# Patient Record
Sex: MALE | Race: WHITE | URBAN - METROPOLITAN AREA
[De-identification: names, ages, dates, MRNs, and addresses within clinical notes are randomized per-mention and may not be internally consistent; named-entity substitution may affect disease eponyms.]

---

## 2018-04-13 ENCOUNTER — APPOINTMENT (RX ONLY)
Dept: URBAN - METROPOLITAN AREA CLINIC 161 | Facility: CLINIC | Age: 83
Setting detail: DERMATOLOGY
End: 2018-04-13

## 2018-04-13 DIAGNOSIS — D22 MELANOCYTIC NEVI: ICD-10-CM

## 2018-04-13 DIAGNOSIS — L57.0 ACTINIC KERATOSIS: ICD-10-CM

## 2018-04-13 DIAGNOSIS — L82.1 OTHER SEBORRHEIC KERATOSIS: ICD-10-CM

## 2018-04-13 PROBLEM — D22.5 MELANOCYTIC NEVI OF TRUNK: Status: ACTIVE | Noted: 2018-04-13

## 2018-04-13 PROCEDURE — ? COUNSELING

## 2018-04-13 PROCEDURE — 99213 OFFICE O/P EST LOW 20 MIN: CPT | Mod: 25

## 2018-04-13 PROCEDURE — 17003 DESTRUCT PREMALG LES 2-14: CPT

## 2018-04-13 PROCEDURE — 17000 DESTRUCT PREMALG LESION: CPT

## 2018-04-13 PROCEDURE — ? LIQUID NITROGEN

## 2018-04-13 ASSESSMENT — LOCATION SIMPLE DESCRIPTION DERM
LOCATION SIMPLE: LEFT ANKLE
LOCATION SIMPLE: LEFT UPPER BACK
LOCATION SIMPLE: SCALP
LOCATION SIMPLE: RIGHT UPPER ARM
LOCATION SIMPLE: CHEST
LOCATION SIMPLE: ABDOMEN
LOCATION SIMPLE: RIGHT ANKLE
LOCATION SIMPLE: LEFT PRETIBIAL REGION
LOCATION SIMPLE: RIGHT FOREHEAD
LOCATION SIMPLE: RIGHT LOWER BACK
LOCATION SIMPLE: RIGHT PRETIBIAL REGION

## 2018-04-13 ASSESSMENT — LOCATION ZONE DERM
LOCATION ZONE: FACE
LOCATION ZONE: TRUNK
LOCATION ZONE: SCALP
LOCATION ZONE: LEG
LOCATION ZONE: ARM

## 2018-04-13 ASSESSMENT — LOCATION DETAILED DESCRIPTION DERM
LOCATION DETAILED: LEFT DISTAL PRETIBIAL REGION
LOCATION DETAILED: RIGHT DISTAL LATERAL POSTERIOR UPPER ARM
LOCATION DETAILED: RIGHT DISTAL PRETIBIAL REGION
LOCATION DETAILED: RIGHT POSTERIOR ANKLE
LOCATION DETAILED: LEFT CENTRAL PARIETAL SCALP
LOCATION DETAILED: RIGHT MEDIAL FOREHEAD
LOCATION DETAILED: RIGHT INFERIOR MEDIAL MIDBACK
LOCATION DETAILED: EPIGASTRIC SKIN
LOCATION DETAILED: LEFT MEDIAL SUPERIOR CHEST
LOCATION DETAILED: LEFT MID-UPPER BACK
LOCATION DETAILED: LEFT POSTERIOR ANKLE
LOCATION DETAILED: RIGHT SUPERIOR FOREHEAD

## 2018-04-13 NOTE — HPI: EVALUATION OF SKIN LESION(S)
How Severe Are Your Spot(S)?: moderate
Have Your Spot(S) Been Treated In The Past?: has been treated
Hpi Title: Evaluation of Skin Lesions
Additional History: Patient states he applied Efudex to the lesion x5-6 weeks.  Patient states slight improvement, however the lesion has not resolved.

## 2019-03-04 ENCOUNTER — APPOINTMENT (RX ONLY)
Dept: URBAN - METROPOLITAN AREA CLINIC 173 | Facility: CLINIC | Age: 84
Setting detail: DERMATOLOGY
End: 2019-03-04

## 2019-03-04 DIAGNOSIS — L57.0 ACTINIC KERATOSIS: ICD-10-CM

## 2019-03-04 DIAGNOSIS — I83.9 ASYMPTOMATIC VARICOSE VEINS OF LOWER EXTREMITIES: ICD-10-CM

## 2019-03-04 DIAGNOSIS — D22 MELANOCYTIC NEVI: ICD-10-CM

## 2019-03-04 DIAGNOSIS — Z71.89 OTHER SPECIFIED COUNSELING: ICD-10-CM

## 2019-03-04 DIAGNOSIS — D18.0 HEMANGIOMA: ICD-10-CM

## 2019-03-04 DIAGNOSIS — L82.1 OTHER SEBORRHEIC KERATOSIS: ICD-10-CM

## 2019-03-04 DIAGNOSIS — L85.3 XEROSIS CUTIS: ICD-10-CM

## 2019-03-04 DIAGNOSIS — D69.2 OTHER NONTHROMBOCYTOPENIC PURPURA: ICD-10-CM

## 2019-03-04 PROBLEM — D22.5 MELANOCYTIC NEVI OF TRUNK: Status: ACTIVE | Noted: 2019-03-04

## 2019-03-04 PROBLEM — D18.01 HEMANGIOMA OF SKIN AND SUBCUTANEOUS TISSUE: Status: ACTIVE | Noted: 2019-03-04

## 2019-03-04 PROBLEM — I83.93 ASYMPTOMATIC VARICOSE VEINS OF BILATERAL LOWER EXTREMITIES: Status: ACTIVE | Noted: 2019-03-04

## 2019-03-04 PROCEDURE — ? LIQUID NITROGEN

## 2019-03-04 PROCEDURE — 17000 DESTRUCT PREMALG LESION: CPT

## 2019-03-04 PROCEDURE — 99213 OFFICE O/P EST LOW 20 MIN: CPT | Mod: 25

## 2019-03-04 PROCEDURE — ? COUNSELING

## 2019-03-04 PROCEDURE — 17003 DESTRUCT PREMALG LES 2-14: CPT

## 2019-03-04 ASSESSMENT — LOCATION SIMPLE DESCRIPTION DERM
LOCATION SIMPLE: LEFT FOREARM
LOCATION SIMPLE: RIGHT PRETIBIAL REGION
LOCATION SIMPLE: UPPER BACK
LOCATION SIMPLE: RIGHT THIGH
LOCATION SIMPLE: LEFT SCALP
LOCATION SIMPLE: RIGHT TEMPLE
LOCATION SIMPLE: LEFT PRETIBIAL REGION
LOCATION SIMPLE: LEFT CALF
LOCATION SIMPLE: RIGHT UPPER BACK
LOCATION SIMPLE: RIGHT CALF
LOCATION SIMPLE: CHEST
LOCATION SIMPLE: LEFT THIGH
LOCATION SIMPLE: ABDOMEN
LOCATION SIMPLE: RIGHT FOREARM
LOCATION SIMPLE: POSTERIOR SCALP

## 2019-03-04 ASSESSMENT — LOCATION DETAILED DESCRIPTION DERM
LOCATION DETAILED: LEFT PROXIMAL CALF
LOCATION DETAILED: LEFT MEDIAL SUPERIOR CHEST
LOCATION DETAILED: RIGHT PROXIMAL DORSAL FOREARM
LOCATION DETAILED: LEFT DISTAL DORSAL FOREARM
LOCATION DETAILED: RIGHT ANTERIOR DISTAL THIGH
LOCATION DETAILED: LEFT PROXIMAL PRETIBIAL REGION
LOCATION DETAILED: RIGHT SUPERIOR UPPER BACK
LOCATION DETAILED: INFERIOR THORACIC SPINE
LOCATION DETAILED: LEFT PROXIMAL DORSAL FOREARM
LOCATION DETAILED: RIGHT CENTRAL TEMPLE
LOCATION DETAILED: RIGHT PROXIMAL CALF
LOCATION DETAILED: LEFT CENTRAL FRONTAL SCALP
LOCATION DETAILED: RIGHT PROXIMAL PRETIBIAL REGION
LOCATION DETAILED: LEFT SUPERIOR OCCIPITAL SCALP
LOCATION DETAILED: LEFT POSTERIOR PARIETAL SCALP
LOCATION DETAILED: LEFT ANTERIOR DISTAL THIGH
LOCATION DETAILED: PERIUMBILICAL SKIN
LOCATION DETAILED: EPIGASTRIC SKIN

## 2019-03-04 ASSESSMENT — LOCATION ZONE DERM
LOCATION ZONE: SCALP
LOCATION ZONE: FACE
LOCATION ZONE: ARM
LOCATION ZONE: TRUNK
LOCATION ZONE: LEG

## 2021-03-18 ENCOUNTER — APPOINTMENT (RX ONLY)
Dept: URBAN - METROPOLITAN AREA CLINIC 158 | Facility: CLINIC | Age: 86
Setting detail: DERMATOLOGY
End: 2021-03-18

## 2021-03-18 DIAGNOSIS — L57.0 ACTINIC KERATOSIS: ICD-10-CM

## 2021-03-18 DIAGNOSIS — L98.8 OTHER SPECIFIED DISORDERS OF THE SKIN AND SUBCUTANEOUS TISSUE: ICD-10-CM

## 2021-03-18 DIAGNOSIS — L82.1 OTHER SEBORRHEIC KERATOSIS: ICD-10-CM

## 2021-03-18 DIAGNOSIS — D22 MELANOCYTIC NEVI: ICD-10-CM

## 2021-03-18 DIAGNOSIS — D69.2 OTHER NONTHROMBOCYTOPENIC PURPURA: ICD-10-CM

## 2021-03-18 DIAGNOSIS — L73.8 OTHER SPECIFIED FOLLICULAR DISORDERS: ICD-10-CM

## 2021-03-18 PROBLEM — D48.5 NEOPLASM OF UNCERTAIN BEHAVIOR OF SKIN: Status: ACTIVE | Noted: 2021-03-18

## 2021-03-18 PROBLEM — D22.5 MELANOCYTIC NEVI OF TRUNK: Status: ACTIVE | Noted: 2021-03-18

## 2021-03-18 PROCEDURE — 17000 DESTRUCT PREMALG LESION: CPT

## 2021-03-18 PROCEDURE — 69100 BIOPSY OF EXTERNAL EAR: CPT | Mod: 59

## 2021-03-18 PROCEDURE — 17003 DESTRUCT PREMALG LES 2-14: CPT

## 2021-03-18 PROCEDURE — 99213 OFFICE O/P EST LOW 20 MIN: CPT | Mod: 25

## 2021-03-18 PROCEDURE — ? COUNSELING

## 2021-03-18 PROCEDURE — ? BIOPSY BY SHAVE METHOD

## 2021-03-18 PROCEDURE — ? LIQUID NITROGEN

## 2021-03-18 ASSESSMENT — LOCATION ZONE DERM
LOCATION ZONE: EAR
LOCATION ZONE: SCALP
LOCATION ZONE: LEG
LOCATION ZONE: TRUNK
LOCATION ZONE: ARM
LOCATION ZONE: FACE

## 2021-03-18 ASSESSMENT — LOCATION SIMPLE DESCRIPTION DERM
LOCATION SIMPLE: RIGHT PRETIBIAL REGION
LOCATION SIMPLE: CHEST
LOCATION SIMPLE: RIGHT FOREHEAD
LOCATION SIMPLE: LOWER BACK
LOCATION SIMPLE: LEFT FOREARM
LOCATION SIMPLE: LEFT PRETIBIAL REGION
LOCATION SIMPLE: RIGHT EAR
LOCATION SIMPLE: RIGHT FOREARM
LOCATION SIMPLE: LEFT EAR
LOCATION SIMPLE: ABDOMEN
LOCATION SIMPLE: ANTERIOR SCALP
LOCATION SIMPLE: POSTERIOR SCALP
LOCATION SIMPLE: UPPER BACK
LOCATION SIMPLE: RIGHT SCALP

## 2021-03-18 ASSESSMENT — LOCATION DETAILED DESCRIPTION DERM
LOCATION DETAILED: LEFT DISTAL DORSAL FOREARM
LOCATION DETAILED: LEFT INFERIOR POSTERIOR HELIX
LOCATION DETAILED: EPIGASTRIC SKIN
LOCATION DETAILED: MID-FRONTAL SCALP
LOCATION DETAILED: LEFT POSTERIOR EAR
LOCATION DETAILED: STERNUM
LOCATION DETAILED: RIGHT DISTAL PRETIBIAL REGION
LOCATION DETAILED: SUPERIOR THORACIC SPINE
LOCATION DETAILED: SUPERIOR LUMBAR SPINE
LOCATION DETAILED: RIGHT SUPERIOR POSTERIOR HELIX
LOCATION DETAILED: RIGHT INFERIOR FOREHEAD
LOCATION DETAILED: LEFT DISTAL PRETIBIAL REGION
LOCATION DETAILED: RIGHT DISTAL DORSAL FOREARM
LOCATION DETAILED: POSTERIOR MID-PARIETAL SCALP
LOCATION DETAILED: RIGHT SUPERIOR POSTERIOR PARIETAL SCALP
LOCATION DETAILED: PERIUMBILICAL SKIN
LOCATION DETAILED: RIGHT SUPERIOR MEDIAL FOREHEAD
LOCATION DETAILED: RIGHT MEDIAL FRONTAL SCALP

## 2021-03-18 ASSESSMENT — BSA RASH: BSA RASH: 2

## 2021-03-18 ASSESSMENT — SEVERITY ASSESSMENT: SEVERITY: MILD

## 2021-03-30 ENCOUNTER — APPOINTMENT (RX ONLY)
Dept: URBAN - METROPOLITAN AREA CLINIC 158 | Facility: CLINIC | Age: 86
Setting detail: DERMATOLOGY
End: 2021-03-30

## 2021-03-30 DIAGNOSIS — L57.0 ACTINIC KERATOSIS: ICD-10-CM

## 2021-03-30 PROCEDURE — 17000 DESTRUCT PREMALG LESION: CPT

## 2021-03-30 PROCEDURE — ? LIQUID NITROGEN

## 2021-03-30 ASSESSMENT — LOCATION SIMPLE DESCRIPTION DERM: LOCATION SIMPLE: RIGHT EAR

## 2021-03-30 ASSESSMENT — LOCATION DETAILED DESCRIPTION DERM: LOCATION DETAILED: RIGHT INFERIOR POSTERIOR HELIX

## 2021-03-30 ASSESSMENT — LOCATION ZONE DERM: LOCATION ZONE: EAR

## 2022-03-17 ENCOUNTER — APPOINTMENT (RX ONLY)
Dept: URBAN - METROPOLITAN AREA CLINIC 158 | Facility: CLINIC | Age: 87
Setting detail: DERMATOLOGY
End: 2022-03-17

## 2022-03-17 DIAGNOSIS — L81.4 OTHER MELANIN HYPERPIGMENTATION: ICD-10-CM

## 2022-03-17 DIAGNOSIS — D18.0 HEMANGIOMA: ICD-10-CM

## 2022-03-17 DIAGNOSIS — L82.1 OTHER SEBORRHEIC KERATOSIS: ICD-10-CM

## 2022-03-17 DIAGNOSIS — D22 MELANOCYTIC NEVI: ICD-10-CM

## 2022-03-17 DIAGNOSIS — L57.0 ACTINIC KERATOSIS: ICD-10-CM

## 2022-03-17 PROBLEM — D18.01 HEMANGIOMA OF SKIN AND SUBCUTANEOUS TISSUE: Status: ACTIVE | Noted: 2022-03-17

## 2022-03-17 PROBLEM — D22.5 MELANOCYTIC NEVI OF TRUNK: Status: ACTIVE | Noted: 2022-03-17

## 2022-03-17 PROCEDURE — ? COUNSELING

## 2022-03-17 PROCEDURE — 99213 OFFICE O/P EST LOW 20 MIN: CPT

## 2022-03-17 PROCEDURE — ? TREATMENT REGIMEN

## 2022-03-17 ASSESSMENT — LOCATION DETAILED DESCRIPTION DERM
LOCATION DETAILED: RIGHT CENTRAL MALAR CHEEK
LOCATION DETAILED: LEFT POSTERIOR SHOULDER
LOCATION DETAILED: LEFT LATERAL FOREHEAD
LOCATION DETAILED: RIGHT SUPERIOR LATERAL NECK
LOCATION DETAILED: RIGHT INFERIOR LATERAL FOREHEAD
LOCATION DETAILED: RIGHT CENTRAL ZYGOMA
LOCATION DETAILED: LEFT MEDIAL TRAPEZIAL NECK
LOCATION DETAILED: EPIGASTRIC SKIN

## 2022-03-17 ASSESSMENT — LOCATION SIMPLE DESCRIPTION DERM
LOCATION SIMPLE: LEFT SHOULDER
LOCATION SIMPLE: ABDOMEN
LOCATION SIMPLE: POSTERIOR NECK
LOCATION SIMPLE: LEFT FOREHEAD
LOCATION SIMPLE: NECK
LOCATION SIMPLE: RIGHT ZYGOMA
LOCATION SIMPLE: RIGHT FOREHEAD
LOCATION SIMPLE: RIGHT CHEEK

## 2022-03-17 ASSESSMENT — LOCATION ZONE DERM
LOCATION ZONE: NECK
LOCATION ZONE: ARM
LOCATION ZONE: TRUNK
LOCATION ZONE: FACE

## 2022-05-26 NOTE — TELEPHONE ENCOUNTER
FUTURE VISIT INFORMATION      FUTURE VISIT INFORMATION:    Date: 9/12/22    Time: 9AM    [place] [facility] INFORMATION:    Referring provider:      Referring providers clinic:      Reason for visit/diagnosis  Paralyzed vocal cord, per pt, pt will have med rec [place] [facility] [place]    RECORDS REQUESTED FROM:       Clinic name Comments Records Status Imaging Status   [place] [address] Speech Language Pathology   10/24/2011 note from Bhavana Alvarado, SLP  Care everywhere     [facility] [place] [facility] Pulmonary Medicine   9/22/2010 note from Digna Franco APRN, CNP   Care everywhere    [facility] Endoscopy   5/13/2010 Bronchoscopy   Care everywhere    [facility] 380 Ear, Nose, and Throat  10/30/2008 note from Sravani Randolph MD   Care everywhere

## 2022-09-12 ENCOUNTER — VIRTUAL VISIT (OUTPATIENT)
Dept: OTOLARYNGOLOGY | Facility: CLINIC | Age: 87
End: 2022-09-12
Payer: COMMERCIAL

## 2022-09-12 ENCOUNTER — PRE VISIT (OUTPATIENT)
Dept: OTOLARYNGOLOGY | Facility: CLINIC | Age: 87
End: 2022-09-12
Payer: COMMERCIAL

## 2022-09-12 ENCOUNTER — OFFICE VISIT (OUTPATIENT)
Dept: OTOLARYNGOLOGY | Facility: CLINIC | Age: 87
End: 2022-09-12
Payer: COMMERCIAL

## 2022-09-12 VITALS
WEIGHT: 165.9 LBS | HEART RATE: 72 BPM | SYSTOLIC BLOOD PRESSURE: 150 MMHG | DIASTOLIC BLOOD PRESSURE: 75 MMHG | HEIGHT: 72 IN | BODY MASS INDEX: 22.47 KG/M2

## 2022-09-12 DIAGNOSIS — R49.0 DYSPHONIA: Primary | ICD-10-CM

## 2022-09-12 DIAGNOSIS — R49.8 VOCAL TREMOR: ICD-10-CM

## 2022-09-12 DIAGNOSIS — R13.19 OTHER DYSPHAGIA: ICD-10-CM

## 2022-09-12 DIAGNOSIS — J38.01 VOCAL FOLD PARALYSIS, RIGHT: ICD-10-CM

## 2022-09-12 DIAGNOSIS — J38.01 UNILATERAL COMPLETE PARALYSIS OF VOCAL CORD: Primary | ICD-10-CM

## 2022-09-12 DIAGNOSIS — R49.0 DYSPHONIA: ICD-10-CM

## 2022-09-12 PROCEDURE — 31579 LARYNGOSCOPY TELESCOPIC: CPT | Performed by: OTOLARYNGOLOGY

## 2022-09-12 PROCEDURE — 99204 OFFICE O/P NEW MOD 45 MIN: CPT | Mod: 25 | Performed by: OTOLARYNGOLOGY

## 2022-09-12 PROCEDURE — 92524 BEHAVRAL QUALIT ANALYS VOICE: CPT | Mod: GN | Performed by: SPEECH-LANGUAGE PATHOLOGIST

## 2022-09-12 RX ORDER — ACETAMINOPHEN 500 MG
TABLET ORAL EVERY 6 HOURS PRN
COMMUNITY

## 2022-09-12 RX ORDER — OMEGA-3/DHA/EPA/FISH OIL 300-1000MG
CAPSULE ORAL
COMMUNITY

## 2022-09-12 RX ORDER — LISINOPRIL 10 MG/1
1 TABLET ORAL DAILY
COMMUNITY
Start: 2022-08-22 | End: 2023-08-22

## 2022-09-12 RX ORDER — TAMSULOSIN HYDROCHLORIDE 0.4 MG/1
1 CAPSULE ORAL DAILY
COMMUNITY
Start: 2021-11-24 | End: 2022-11-24

## 2022-09-12 RX ORDER — ROSUVASTATIN CALCIUM 10 MG/1
10 TABLET, COATED ORAL DAILY
COMMUNITY
Start: 2022-07-02

## 2022-09-12 RX ORDER — APIXABAN 5 MG/1
TABLET, FILM COATED ORAL
COMMUNITY
Start: 2022-06-30

## 2022-09-12 ASSESSMENT — PAIN SCALES - GENERAL: PAINLEVEL: NO PAIN (0)

## 2022-09-12 NOTE — PROGRESS NOTES
"[facility]  Clinical Voice and Upper Airway Evaluation Report    Patient's Name: Kasi Dalton  Date of Evaluation: 9/12/22  Providing SLP: Cheryl Taylor MS CCC-SLP  Seen in Conjunction With: Leonor Zeng MD MPH  Referring Provider and Facility: self-referred  Chief Complaint: dysphonia  Assessment / Treatment Time: 1 hour  Evaluation Location: [facility]  Others in Attendance for the Evaluation: None      Patient History: Ramses is a 87-year-old male who presents today for evaluation of dysphonia.     Dysphonia:     Onset: Unknown. Evaluation from 10/30/2008 with Dr. Sravani Randolph notes idiopathic right vocal fold paralysis at that time. Ramses felt that the onset was due to medication interaction and his nebulizer    Progression: Stable since onset    Voice complaints: rough and gravelly voice quality that worsens with voice use, lack of power, can no longer seeing    Denies throat discomfort    Reason for seeking treatment: A friend of his had received a vocal fold injection of some sort in our clinic, and he felt that it may be helpful for him.    Previous treatments:   o Cymetra injection on 10/30/2008  o Attending unknown amounts of voice therapy in Fall 2011 with Bhavana Alvarado, SLP, and [place]. Ramses does not fully recall this  o Ramses reports that he was evaluated at [place] in [place] and a thyroplasty was recommended; however, he declined this as he would not be able to sing    Dysphagia:    Reports that solids have been feeling like they have been going down the wrong pipe for several years    Coughing with eating 1-2 times per week    He denies issues with liquids; however, he took a sip of wine the other day at [employer] and felt that it was \"too strong\" and went down the wrong pipe    Patient denies undergoing a swallow study; however, chart review indicated he went underwent a swallow study in 2008 with a small amount of penetration and possible " "aspiration.  At that time, he did not think that his swallowing was a problem    Reports reflux symptoms intermittently, sometimes multiple times per week and sometimes not at all.  He takes omeprazole occasionally    Denies any recent pneumonias/chest infections    Dyspnea: denies    Cough / Throat Clearing: denies    Medical / Surgical History:    Cardiac: Pacemaker placement, mitral valve regurgitation, paroxysmal A. fib    Bronchiectasis    KILEY    Right vocal fold paralysis    Other Medical Evaluations, Testing, and Treatments:    Vocal fold augmentation in 2008    Unknown amount of SLP services in 2011    Cognitive Status / Ability to Comprehend Directions: Appropriate    Barriers to Progress: Hearing loss with bilateral hearing aids      Quality of Life Questionnaires:    Patient Supplied Answers To Last 2 VHI Questionnaires  Voice Handicap Index (VHI-10) 9/12/2022   My voice makes it difficult for people to hear me 3   People have difficulty understanding me in a noisy room 3   My voice difficulties restrict my personal and social life.  2   I feel left out of conversations because of my voice 2   My voice problem causes me to lose income 0   I feel as though I have to strain to produce voice 3   The clarity of my voice is unpredictable 2   My voice problem upsets me 2   My voice makes me feel handicapped 2   People ask, \"What's wrong with your voice?\" 1   VHI-10 20     Patient Supplied Answers To Last 2 CSI Questionnaires  Cough Severity Index (CSI) 9/12/2022   My coughing problem causes me to restrict my personal and social life 1   I tend to avoid places because of my cough problem 0   I feel embarrassed because of my coughing problem 2   People ask, ''What's wrong?'' because I cough a lot 1   I run out of air when I cough 2   My coughing problem affects my voice 2   My coughing problem upsets me 3   CSI Score 11     Patient Supplied Answers To Last 2 EAT Questionnaires  Eating Assessment Tool (EAT-10) " 9/12/2022   I cough when I eat 2   EAT-10 2   *Full questionnaire not completed*    Perceptual Analysis (97797): Evaluation of Voice / Speech / Non-Communicative Laryngeal Behaviors  The GRBAS is a perceptual rating of voice change. 0 indicated no impairment, 3 indicates a severe impairment. This is a rating based on clinical judgement of disordered voice quality.  G ( 2 ) General Dysphonia     R ( 2 ) Roughness     B ( 0 ) Breathiness     A ( 1 ) Asthenia     S ( 1 ) Strain  Additional observations: no laryngeal tremor detected in connected speech.      Laryngoscopy with stroboscopy:  Provider performing exam: Leonor Zeng MD MPH    Informed consent: Informed consent was obtained, which includes potential side-effects, risks, and benefits of the procedure.     Anesthetic:  Topical anesthesia with 3% lidocaine and 0.25% phenylephrine was applied the nostrils bilaterally.    Scope type: A distal chip flexible laryngoscope was passed through the nare with halogen and xenon light source(s).     The laryngeal and pharyngeal structures were evaluated for gross appearance, mobility, function, and focal lesions / abnormalities of the associated mucosa.  All findings were within normal limits with the exception of the following salient features:     R Arytenoid Abduction / Adduction: immobile from a medialized position - airway patency is appropriate    Mediolateral Compression: mild to moderate, noted with phonation    Anteroposterior Compression: increased L>R with arytenoid hooding    Appearance: mild laryngeal tremor noted of the arytenoid cartilages and posterior/lateral pharyngeal walls during sustained phonation    Secretions: excessive, particularly in the R pyriform sinus and endolarynx and moving toward the true vocal folds often    Left (L) Vocal Fold Edge: mild concavity with prominent vocal process    Right (R) Vocal Fold Edge: mild concavity with prominent vocal process    Glottic Closure: complete    L  Amplitude: mildly reduced    Phase Symmetry: mildly asymmetrical    Periodicity: intermittently aperiodic - increased with low pitch phonation       Impressions and Plan:   Ramses is an 87-year-old man presenting today with dysphonia (R49.0) secondary to R vocal fold paralysis (J38.01) and 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 Perceptually, his voice is mildly rough with mild dyspnea and strain.  No laryngeal tremor is detected during connected speech. Laryngoscopy today demonstrated right vocal fold immobile from a medialized position with complete glottic closure, mildly reduced pliability, laryngeal hyperfunction and tremor, and excessive secretions in the endolarynx.  Dr. Zeng and I discussed options for his treatment and given his complete glottic closure, surgical intervention via laryngoplasty/thyroplasty has not recommended at this time. Therefore, voice therapy has been recommended with good prognosis. Ramses reports that he spends 6 months out of the year in [place], so I plan to transfer his care to [facility] in [place] for voice therapy beginning when he arrives in late October.  A swallow study has also been recommended given his history of swallowing problems, current complaints, and excessive secretions observed during his laryngoscopy. Ramses is in agreement with this plan of care.       Billed Procedures:    No charge facility fee    Perceptual voice assessment [id number]      Thank you for allowing me to participate in this patient's care,    Cheryl Taylor MS CCC-SLP  Speech-Language Pathologist  [facility] Voice [facility]  [email or web address]  [phone]

## 2022-09-12 NOTE — LETTER
9/12/2022      RE: Kasi Dalton  [address]  [place] [address]       Dear Colleague:    I had the pleasure of meeting Kasi Dalton in consultation at the [facility] of the [facility] on a self-referred basis, for evaluation of throat concerns. The note from our visit follows. Speech recognition software may have been used in the documentation below; input is reviewed before signature to the best of my ability. I appreciate the opportunity to participate in the care of this pleasant patient.    Please feel free to contact me with any questions.    Sincerely yours,      Leonor Zeng M.D., M.P.H.  , Laryngology  Director, [facility] Voice [facility]  Otolaryngology- Head & Neck Surgery  [phone]          =====  HISTORY OF PRESENT ILLNESS:   Kasi Dalton is a pleasant 87-year-old male with a past medical history including pacemaker, bronchiectasis (doing better recently), obstructive sleep apnea, hypogammaglobulinemia, mitral regurgitation, paroxysmal atrial fibrillation on anticoagulation, GERD, COVID-19,  who presents with a long history of throat concerns.       Voice  He notes a 10-15 year history of voice problems.   Record review shows that he worked with Bhavana Alvarado, SLP in 2011, with a diagnosis of unilateral vocal fold paralysis. He does not recall exactly. He vaguely recalls hearing about a possible vocal fold implant at [facility], but was told that he might still not be able to sing with it in, so he chose not to pursue it. Prior to that, he underwent a Cymetra injection with Dr. Randolph in 2008 with possible improvement per the records.  The patient does not specifically recall finding it helpful but it was a long time ago per his comments.    His symptoms are of unknown etiology, and imaging was negative. He has a friend who had a voice issue and had improvement with being seen in our clinic, so he  decided to be seen again.    He notes his voice is worse with use; no odynophonia.      Swallowing  Food sometimes gets stuck or goes down the wrong tube. Solids are worse than liquids. This occurs 1-2x/ week, rarely with liquids.    Chart review identifies a swallow study from 2008 with small amounts of penetration and possible aspiration.  He does not recall pursuing further management of this as he felt he was doing well. No recent pneumonias or lung infections.      Cough/Throat-clearing  No concerns, other than above.      Breathing  No concerns.       Throat discomfort  No concerns.       Reflux-type symptoms  He experiences heartburn/indigestion variably, sometimes multiple times a week, sometimes not at all. He finds it seems to help to take supplements earlier in the evening. He is not taking reflux medications.      Prior outside records were reviewed for this visit, including:  10/24/11 Bhavana Alvarado SLP  10/30/08 Dr. Sravani Randolph  8/10/22 Dr. Alessia Marie    MEDICATIONS:     Current Outpatient Medications   Medication Sig Dispense Refill     Ascorbic Acid 100 MG CHEW        ELEMENTAL MAGNESIUM OR        ELIQUIS ANTICOAGULANT 5 MG tablet        fish oil-omega-3 fatty acids 1000 MG capsule        lisinopril (ZESTRIL) 10 MG tablet Take 1 tablet by mouth daily       omeprazole (PRILOSEC) 20 MG DR capsule Take by mouth daily       rosuvastatin (CRESTOR) 10 MG tablet Take 10 mg by mouth daily       tamsulosin (FLOMAX) 0.4 MG capsule Take 1 capsule by mouth daily       acetaminophen (TYLENOL) 500 MG tablet Take by mouth every 6 hours as needed for mild pain (Patient not taking: Reported on 9/12/2022)         ALLERGIES:    Allergies   Allergen Reactions     Ibuprofen Difficulty breathing       PAST MEDICAL HISTORY: No past medical history on file.    Problem Noted Date   Pacemaker 06/22/2022   History of atrial fibrillation 05/16/2022   COVID-19 08/09/2021   Overview:     Formatting of this note might be  different from the original.  Hospitalized for 1 week 4/2021 in [place].   COVID-19 vaccination declined 08/09/2021   Paroxysmal atrial fibrillation 05/15/2020   Mixed hyperlipidemia 05/15/2020   GERD (gastroesophageal reflux disease) 05/15/2020   Long term current use of anticoagulant therapy 10/02/2017   Overview:     Formatting of this note might be different from the original.  Update from Spring 2018 IMO load.   Non-rheumatic mitral regurgitation 09/28/2017   Essential hypertension 09/27/2017   Osteoarthritis 09/27/2017   S/P right inguinal hernia repair 06/08/2015   Hypogammaglobulinemia 01/18/2011   Sleep apnea 08/19/2010   Overview:     Formatting of this note might be different from the original.  LW Modifier: AHI 18/hour,Intolerant of PAP  ; Obstructive Sleep Apnea Hypopnea     Medical History Date Comments   Hypogammaglobulinemia (HRC) 01/18/2011     Essential hypertension (HRC) 09/27/2017     Long term current use of anticoagulant therapy 10/02/2017 Update from Spring 2018 IMO load.   Mixed hyperlipidemia (HRC) 05/15/2020     Non-rheumatic mitral regurgitation (HRC) 09/28/2017     Osteoarthritis 09/27/2017     Paroxysmal atrial fibrillation (HRC) 05/15/2020 Ablation 2/2022 in [place]   Sleep apnea 08/19/2010 LW Modifier: AHI 18/hour,Intolerant of PAP ; Obstructive Sleep Apnea Hypopnea   Covid-19 08/09/2021 Hospitalized for 1 week 4/2021 in [place].   COVID-19 vaccination declined 08/09/2021     Gastroesophageal reflux disease       Pacemaker               PAST SURGICAL HISTORY: No past surgical history on file.   Surgery Date Site/Laterality Comments   TONSILLECTOMY          HERNIA REPAIR          TOTAL HIP ARTHROPLASTY          BLEPHAROPLASTY          INGUINAL HERNIA REPAIR BILATERAL 05/26/2015 Right repair with mesh     CARDIAC PACEMAKER PLACEMENT     1/5/2022 fro [facility]             HABITS/SOCIAL HISTORY:    Social History     Tobacco Use     Smoking status: Not on file     Smokeless tobacco: Not on file   Substance Use  Topics     Alcohol use: Not on file         FAMILY HISTORY:  No family history on file. Noncontributory.    REVIEW OF SYSTEMS:  Comprehensive 11 point review of systems was reviewed. Positives are as noted below; pertinent findings are as noted in the HPI.     Patient Supplied Answers to Review of Systems            PHYSICAL EXAMINATION:  General: The patient was alert and conversant, and in no acute distress.    Eyes: PERRL, conjunctiva and lids normal, sclera nonicteric.  Nose: Anterior rhinoscopy: no gross abnormalities. no  bleeding; no  mucopurulence; septum grossly normal, mild mucoid drainage and/or crusting.  Oral cavity/oropharynx: No masses or lesions. Dentition in good condition. Floor of mouth and oral tongue soft to palpation. Tongue mobility and palate elevation intact and symmetric.  Ears: Normal auricles, external auditory canals bilaterally. Visualized portions of tympanic membranes normal bilaterally. Mod cerumen right EAC.  Neck: No palpable cervical lymphadenopathy. There was no  tenderness to palpation of the thyrohyoid space, which was not narrow. No obvious thyroid abnormality. Landmarks palpable.  Resp: Breathing comfortably, no stridor or stertor.  Neuro: Symmetric facial function. Other cranial nerves as documented above.  Psych: Normal affect, pleasant and cooperative.  Voice/speech: Moderate dysphonia characterized by breathiness, roughness, strain and tremor.  Extremities: No cyanosis, clubbing, or edema of the upper extremities.      Intake scores  Total Score for Last Patient-Answered VHI Questionnaire  No flowsheet data found.  Total Score for Last Patient-Answered EAT Questionnaire  No flowsheet data found.    Total Score for Last Patient-Answered CSI Questionnaire  No flowsheet data found.          PROCEDURE:   Flexible fiberoptic laryngoscopy and laryngovideostroboscopy  Indications: This procedure was warranted to evaluate the patient's laryngeal anatomy and function. Risks,  benefits, and alternatives were discussed.  Description: After written informed consent was obtained, a time-out was performed to confirm patient identity, procedure, and procedure site. Topical 3% lidocaine with 0.25% phenylephrine was applied to the nasal cavities. I performed the endoscopy and no complications were apparent. Continuous and stroboscopic light were utilized to assess routine phonation and variable frequency phonation.  Performed by: Leonor Zeng MD MPH  SLP: Cheyrl Taylor MS CCC-SLP   Findings: Normal nasopharynx. Normal base of tongue, valleculae, and epiglottis. Vocal fold mobility: right: absent in median position; left: normal. Medial edges of the vocal folds: smooth and straight. No focal mucosal lesions were observed on the true vocal folds. Glissade produced appropriate elongation. There was moderate supraglottic recruitment with connected speech. Mucosa of false vocal folds, aryepiglottic folds, piriform sinuses, and posterior glottis unremarkable. Airway was patent. Response to the therapy probes was good.  Obvious vocal tremor, mixed horizontal and A-P.    The addition of stroboscopy allowed evaluation of the mucosal wave.   Amplitude: right: normal; left: normal. Symmetry: intermittent symmetry. Closure pattern: complete. Closure plane: at glottic level. Phase distribution: normal.                            IMAGING/RESULTS reviewed, with pertinent report excerpts below:  9/25/2008 MRI Orbit Face with contrast      1. The asymmetric soft-tissue attenuation and prominence   involving the right lateral laryngeal tissues described on the CT   scan of 09/01/2008 is not clearly identified on this MRI study.  A   mucosal-based lesion is not excluded on the basis of this study, but   there is no evidence of submucosal enhancing lesion.  The finding   described on the previous CT scan may have represented inflammatory   tissue or focal secretions.        2. There is minimal asymmetry of  the laryngeal tissues and vocal   cords with probable flaccid right vocal cord which is all in keeping   with history of vocal cord paralysis.        3. No evidence of cervical lymphadenopathy.        4. Opacification of the visualized inferior ethmoid air cells   bilaterally consistent with inflammation.        5. Small probable mucous retention cyst in the left maxillary   sinus with mild mucosal thickening involving maxillary sinuses   bilaterally.        6. Opacification of multiple mastoid air cells on the right.       9/1/2008 CT Neck with contrast      1. Soft-tissue laryngeal mass along the inferior and medial   aspect of the right pyriform sinus extending inferiorly to the level   of the right vocal cord.  The findings are suspicious for malignancy.   There is no lymphadenopathy in the neck.        2. Bilateral mucous retention cysts in the maxillary sinuses.     9/3/08 Video Swallow Study  CLINICAL DATA:  Dysphagia.   FINDINGS:  Oral phase is functional.  Small amounts of penetration   and possibly aspiration occurred with thin liquids.  There were trace   amounts of penetration with nectar-thick liquid.  With the head   turned to the right, penetration seems reduced.  With honey-thick   liquid there was only a trace of penetration observed one time.   Following paste and thicker texture food, there is moderate stasis   within the vallecula.  This is partially reduced with a swallow with   the head turned to the right.         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 He also has an incidentally noted laryngeal tremor which he states is not bothering him.      He was wondering whether an injection could be helpful for him, because his friend had an injection with our clinic that was helpful. We discussed given his presentation, that an injection augmentation may not be the best first step because he already  achieves reasonable glottic closure and did demonstrate better voice quality with therapy probes. We did discuss that if his improvement in response to speech therapy is inadequate, we could revisit consideration of an augmentation, but the improvement might be subtle. He does also potentially have some mild posterior insufficiency which may be more complex to address. We also discussed that Botox injection for the laryngeal tremor could be considered if it began to bother him, but would need to be approached with caution given that side effects may include weakening of the voice as well as dysphagia.      Recommendations:  1) I recommended speech therapy as initial primary management, with goals including improving laryngeal efficiency, improving respiratory/phonatory coordination and improving phonatory mechanics.    2) Given his history of laryngeal penetration and possible aspiration as well as his advancing age, and report of intermittent difficulty with things getting stuck when swallowing, we also recommended a updated video fluoroscopy swallow study with esophagram.  3) The timing of his return will depend on his response and findings based on the steps above. He could consider a minor trial injection augmentation if it seems like he has any substantial glottic insufficiency after completing speech therapy.    I appreciate the opportunity to participate in the care of this patient.     Today's visit required additional screening time, PPE, and cleaning measures to allow for a safe in-person visit, due to the public health emergency.    I spent a total of 50 minutes on 9/12/2022 in chart review, review of tests, patient visit, documentation, care coordination, and/or discussion with other providers about the issues documented above, separate from any documented procedure(s).        Leonor Zeng MD

## 2022-09-12 NOTE — LETTER
9/12/2022       RE: Kasi Dalton  [address]  [place] [address]     Dear Colleague,    Thank you for referring your patient, Kasi Dalton, to the [facility] [place] at [facility]. Please see a copy of my visit note below.    [facility]  Clinical Voice and Upper Airway Evaluation Report    Patient's Name: Kasi Dalton  Date of Evaluation: 9/12/22  Providing SLP: Cheryl Taylor MS CCC-SLP  Seen in Conjunction With: Leonor Zeng MD MPH  Referring Provider and Facility: self-referred  Chief Complaint: dysphonia  Assessment / Treatment Time: 1 hour  Evaluation Location: [facility]  Others in Attendance for the Evaluation: None      Patient History: Ramses is a 87-year-old male who presents today for evaluation of dysphonia.     Dysphonia:     Onset: Unknown. Evaluation from 10/30/2008 with Dr. Sravnai Randolph notes idiopathic right vocal fold paralysis at that time. Ramses felt that the onset was due to medication interaction and his nebulizer    Progression: Stable since onset    Voice complaints: rough and gravelly voice quality that worsens with voice use, lack of power, can no longer seeing    Denies throat discomfort    Reason for seeking treatment: A friend of his had received a vocal fold injection of some sort in our clinic, and he felt that it may be helpful for him.    Previous treatments:   o Cymetra injection on 10/30/2008  o Attending unknown amounts of voice therapy in Fall 2011 with Bhavana Alvarado, SLP, and [place]. Ramses does not fully recall this  o Ramses reports that he was evaluated at [place] in [place] and a thyroplasty was recommended; however, he declined this as he would not be able to sing    Dysphagia:    Reports that solids have been feeling like they have been going down the wrong pipe for several years    Coughing with eating 1-2 times per week    He denies issues  "with liquids; however, he took a sip of wine the other day at [employer] and felt that it was \"too strong\" and went down the wrong pipe    Patient denies undergoing a swallow study; however, chart review indicated he went underwent a swallow study in 2008 with a small amount of penetration and possible aspiration.  At that time, he did not think that his swallowing was a problem    Reports reflux symptoms intermittently, sometimes multiple times per week and sometimes not at all.  He takes omeprazole occasionally    Denies any recent pneumonias/chest infections    Dyspnea: denies    Cough / Throat Clearing: denies    Medical / Surgical History:    Cardiac: Pacemaker placement, mitral valve regurgitation, paroxysmal A. fib    Bronchiectasis    KILEY    Right vocal fold paralysis    Other Medical Evaluations, Testing, and Treatments:    Vocal fold augmentation in 2008    Unknown amount of SLP services in 2011    Cognitive Status / Ability to Comprehend Directions: Appropriate    Barriers to Progress: Hearing loss with bilateral hearing aids      Quality of Life Questionnaires:    Patient Supplied Answers To Last 2 VHI Questionnaires  Voice Handicap Index (VHI-10) 9/12/2022   My voice makes it difficult for people to hear me 3   People have difficulty understanding me in a noisy room 3   My voice difficulties restrict my personal and social life.  2   I feel left out of conversations because of my voice 2   My voice problem causes me to lose income 0   I feel as though I have to strain to produce voice 3   The clarity of my voice is unpredictable 2   My voice problem upsets me 2   My voice makes me feel handicapped 2   People ask, \"What's wrong with your voice?\" 1   VHI-10 20     Patient Supplied Answers To Last 2 CSI Questionnaires  Cough Severity Index (CSI) 9/12/2022   My coughing problem causes me to restrict my personal and social life 1   I tend to avoid places because of my cough problem 0   I feel embarrassed because " of my coughing problem 2   People ask, ''What's wrong?'' because I cough a lot 1   I run out of air when I cough 2   My coughing problem affects my voice 2   My coughing problem upsets me 3   CSI Score 11     Patient Supplied Answers To Last 2 EAT Questionnaires  Eating Assessment Tool (EAT-10) 9/12/2022   I cough when I eat 2   EAT-10 2   *Full questionnaire not completed*    Perceptual Analysis (48744): Evaluation of Voice / Speech / Non-Communicative Laryngeal Behaviors  The GRBAS is a perceptual rating of voice change. 0 indicated no impairment, 3 indicates a severe impairment. This is a rating based on clinical judgement of disordered voice quality.  G ( 2 ) General Dysphonia     R ( 2 ) Roughness     B ( 0 ) Breathiness     A ( 1 ) Asthenia     S ( 1 ) Strain  Additional observations: no laryngeal tremor detected in connected speech.      Laryngoscopy with stroboscopy:  Provider performing exam: Leonor Zeng MD MPH    Informed consent: Informed consent was obtained, which includes potential side-effects, risks, and benefits of the procedure.     Anesthetic:  Topical anesthesia with 3% lidocaine and 0.25% phenylephrine was applied the nostrils bilaterally.    Scope type: A distal chip flexible laryngoscope was passed through the nare with halogen and xenon light source(s).     The laryngeal and pharyngeal structures were evaluated for gross appearance, mobility, function, and focal lesions / abnormalities of the associated mucosa.  All findings were within normal limits with the exception of the following salient features:     R Arytenoid Abduction / Adduction: immobile from a medialized position - airway patency is appropriate    Mediolateral Compression: mild to moderate, noted with phonation    Anteroposterior Compression: increased L>R with arytenoid hooding    Appearance: mild laryngeal tremor noted of the arytenoid cartilages and posterior/lateral pharyngeal walls during sustained  phonation    Secretions: excessive, particularly in the R pyriform sinus and endolarynx and moving toward the true vocal folds often    Left (L) Vocal Fold Edge: mild concavity with prominent vocal process    Right (R) Vocal Fold Edge: mild concavity with prominent vocal process    Glottic Closure: complete    L Amplitude: mildly reduced    Phase Symmetry: mildly asymmetrical    Periodicity: intermittently aperiodic - increased with low pitch phonation       Impressions and Plan:   Ramses is an 87-year-old man presenting today with dysphonia (R49.0) secondary to R vocal fold paralysis (J38.01) and 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 Perceptually, his voice is mildly rough with mild dyspnea and strain.  No laryngeal tremor is detected during connected speech. Laryngoscopy today demonstrated right vocal fold immobile from a medialized position with complete glottic closure, mildly reduced pliability, laryngeal hyperfunction and tremor, and excessive secretions in the endolarynx.  Dr. Zeng and I discussed options for his treatment and given his complete glottic closure, surgical intervention via laryngoplasty/thyroplasty has not recommended at this time. Therefore, voice therapy has been recommended with good prognosis. Ramses reports that he spends 6 months out of the year in [place], so I plan to transfer his care to [facility] in [place] for voice therapy beginning when he arrives in late October.  A swallow study has also been recommended given his history of swallowing problems, current complaints, and excessive secretions observed during his laryngoscopy. Ramses is in agreement with this plan of care.       Billed Procedures:    No charge facility fee    Perceptual voice assessment 10646      Thank you for allowing me to participate in this patient's care,    Cheryl Taylor MS CCC-SLP  Speech-Language Pathologist  [facility]  [email or web address]  [phone]      Again,  thank you for allowing me to participate in the care of your patient.      Sincerely,    Cheryl Taylor, SLP

## 2022-09-12 NOTE — LETTER
Date:September 16, 2022      Patient was self referred, no letter generated. Do not send.        [facility] Health Information

## 2022-09-12 NOTE — Clinical Note
9/12/2022       RE: Kasi Dalton  [address]  [place] [address]     Dear Colleague,    Thank you for referring your patient, Kasi Dalton, to the [facility] [place] at [facility]. Please see a copy of my visit note below.    Dear Colleague:    I had the pleasure of meeting Kasi Dalton in consultation at the [facility] of the [facility] Otolaryngology Clinic on a self-referred basis, for evaluation of throat concerns. The note from our visit follows. Speech recognition software may have been used in the documentation below; input is reviewed before signature to the best of my ability. I appreciate the opportunity to participate in the care of this pleasant*** patient.    Please feel free to contact me with any questions.    Sincerely yours,      Leonor Zeng M.D., M.P.H.  , Laryngology  Director, [facility]  Otolaryngology- Head & Neck Surgery  [phone]          =====  HISTORY OF PRESENT ILLNESS: ***  Kasi Dalton is a pleasant 87-year-old male with a past medical history including pacemaker, bronchiectasis (doing better recently), obstructive sleep apnea, hypogammaglobulinemia, mitral regurgitation, paroxysmal atrial fibrillation on anticoagulation, GERD, COVID-19,  who presents with a *** history of throat concerns.       Voice  He notes a 10-15 year history of voice problems. His voice is clear.     Record review shows that he worked with Bhavana Alvarado, SLP in 2011, with a diagnosis of unilateral vocal fold paralysis. He does not recall exactly. He vaguely recalls hearing about a possible vocal fold implant at [facility], but was told that he might still not be able to sing with it in, so he chose not to pursue it. Prior to that, he underwent a Cymetra injection with Dr. Randolph in 2008 with some improvement per the chart.  The  patient does not specifically recall.     His symptoms are of unknown etiology, and imaging was negative. He has a friend who had a voice issue and had improvement with being seen in our clinic, so he decided to be seen again.    He notes his symptoms are worse with use, He has no odynophonia.      Swallowing  ***Food sometimes gets stuck or goes down the wrong tube. Solids are worse than liquids. This occurs 1-2x/ week,   rarely with liquids.    Chart review identifies a swallow study from 2008 with small amounts of penetration and possible aspiration.  He does not recall pursuing further management of this as he felt he was doing well. No recent pneumonias or lung infections.      Cough/Throat-clearing  No concerns, other than above.      Breathing  No concerns.       Throat discomfort  No concerns.       Reflux-type symptoms  He experiences heartburn/indigestion variably, sometimes multiple times a week, sometimes not at all. He finds it seems to help to take supplements earlier in the evening. He is not taking reflux medications.      Prior outside records were reviewed for this visit, including:  10/24/11 MASON Shepherd  10/30/08 Dr. Sravani Randolph  8/10/22 Dr. Alessia Marie    MEDICATIONS:     Current Outpatient Medications   Medication Sig Dispense Refill     Ascorbic Acid 100 MG CHEW        ELEMENTAL MAGNESIUM OR        ELIQUIS ANTICOAGULANT 5 MG tablet        fish oil-omega-3 fatty acids 1000 MG capsule        lisinopril (ZESTRIL) 10 MG tablet Take 1 tablet by mouth daily       omeprazole (PRILOSEC) 20 MG DR capsule Take by mouth daily       rosuvastatin (CRESTOR) 10 MG tablet Take 10 mg by mouth daily       tamsulosin (FLOMAX) 0.4 MG capsule Take 1 capsule by mouth daily       acetaminophen (TYLENOL) 500 MG tablet Take by mouth every 6 hours as needed for mild pain (Patient not taking: Reported on 9/12/2022)         ALLERGIES:    Allergies   Allergen Reactions     Ibuprofen Difficulty breathing       PAST  MEDICAL HISTORY: No past medical history on file.    Problem Noted Date   Pacemaker 06/22/2022   History of atrial fibrillation 05/16/2022   COVID-19 08/09/2021   Overview:     Formatting of this note might be different from the original.  Hospitalized for 1 week 4/2021 in [place].   COVID-19 vaccination declined 08/09/2021   Paroxysmal atrial fibrillation 05/15/2020   Mixed hyperlipidemia 05/15/2020   GERD (gastroesophageal reflux disease) 05/15/2020   Long term current use of anticoagulant therapy 10/02/2017   Overview:     Formatting of this note might be different from the original.  Update from Spring 2018 IMO load.   Non-rheumatic mitral regurgitation 09/28/2017   Essential hypertension 09/27/2017   Osteoarthritis 09/27/2017   S/P right inguinal hernia repair 06/08/2015   Hypogammaglobulinemia 01/18/2011   Sleep apnea 08/19/2010   Overview:     Formatting of this note might be different from the original.  LW Modifier: AHI 18/hour,Intolerant of PAP  ; Obstructive Sleep Apnea Hypopnea     Medical History Date Comments   Hypogammaglobulinemia (HRC) 01/18/2011     Essential hypertension (HRC) 09/27/2017     Long term current use of anticoagulant therapy 10/02/2017 Update from Spring 2018 IMO load.   Mixed hyperlipidemia (HRC) 05/15/2020     Non-rheumatic mitral regurgitation (HRC) 09/28/2017     Osteoarthritis 09/27/2017     Paroxysmal atrial fibrillation (HRC) 05/15/2020 Ablation 2/2022 in [place]   Sleep apnea 08/19/2010 LW Modifier: AHI 18/hour,Intolerant of PAP ; Obstructive Sleep Apnea Hypopnea   Covid-19 08/09/2021 Hospitalized for 1 week 4/2021 in [place].   COVID-19 vaccination declined 08/09/2021     Gastroesophageal reflux disease       Pacemaker               PAST SURGICAL HISTORY: No past surgical history on file.   Surgery Date Site/Laterality Comments   TONSILLECTOMY          HERNIA REPAIR          TOTAL HIP ARTHROPLASTY          BLEPHAROPLASTY          INGUINAL HERNIA REPAIR BILATERAL 05/26/2015 Right repair with  mesh     CARDIAC PACEMAKER PLACEMENT     1/5/2022 fro [facility]             HABITS/SOCIAL HISTORY:    Social History     Tobacco Use     Smoking status: Not on file     Smokeless tobacco: Not on file   Substance Use Topics     Alcohol use: Not on file         FAMILY HISTORY:  No family history on file. ***Noncontributory.    REVIEW OF SYSTEMS:  Comprehensive 11 point review of systems was reviewed. Positives are as noted below; pertinent findings are as noted in the HPI.     Patient Supplied Answers to Review of Systems            PHYSICAL EXAMINATION:  General: The patient was alert and conversant, and in no acute distress.    Eyes: PERRL, conjunctiva and lids normal, sclera nonicteric.  Nose: Anterior rhinoscopy: no gross abnormalities. no  bleeding; no  mucopurulence; septum grossly normal, mild mucoid drainage and/or crusting.  Oral cavity/oropharynx: No masses or lesions. Dentition in good condition. Floor of mouth and oral tongue soft to palpation. Tongue mobility and palate elevation intact and symmetric.  Ears: Normal auricles, external auditory canals bilaterally. Visualized portions of tympanic membranes normal bilaterally. Mod cerumen right EAC.  Neck: No palpable cervical lymphadenopathy. There was no  tenderness to palpation of the thyrohyoid space, which was not narrow. No obvious thyroid abnormality. Landmarks palpable.  Resp: Breathing comfortably, no stridor or stertor.  Neuro: Symmetric facial function. Other cranial nerves as documented above.  Psych: Normal affect, pleasant and cooperative.  Voice/speech: Moderate dysphonia characterized by breathiness, roughness, strain and tremor.  Extremities: No cyanosis, clubbing, or edema of the upper extremities.      Intake scores  Total Score for Last Patient-Answered VHI Questionnaire  No flowsheet data found.  Total Score for Last Patient-Answered EAT Questionnaire  No flowsheet data found.    Total Score for Last Patient-Answered CSI Questionnaire  No  flowsheet data found.          PROCEDURE:   Flexible fiberoptic laryngoscopy and laryngovideostroboscopy  Indications: This procedure was warranted to evaluate the patient's laryngeal anatomy and function. Risks, benefits, and alternatives were discussed.  Description: After written informed consent was obtained, a time-out was performed to confirm patient identity, procedure, and procedure site. Topical 3% lidocaine with 0.25% phenylephrine was applied to the nasal cavities. I performed the endoscopy and no complications were apparent. Continuous and stroboscopic light were utilized to assess routine phonation and variable frequency phonation.  Performed by: Leonor Zeng MD MPH  SLP: Cheryl Taylor MS CCC-SLP   Findings: Normal nasopharynx. Normal base of tongue, valleculae, and epiglottis. Vocal fold mobility: right: absent in median position; left: normal. Medial edges of the vocal folds: smooth and straight. No focal mucosal lesions were observed on the true vocal folds. Glissade produced appropriate elongation. There was moderate supraglottic recruitment with connected speech. Mucosa of false vocal folds, aryepiglottic folds, piriform sinuses, and posterior glottis unremarkable. Airway was patent. Response to the therapy probes was good.  Obvious vocal tremor, mixed horizontal and A-P.    The addition of stroboscopy allowed evaluation of the mucosal wave.   Amplitude: right: normal; left: normal. Symmetry: intermittent symmetry. Closure pattern: complete. Closure plane: at glottic level. Phase distribution: normal.                            IMAGING/RESULTS reviewed, with pertinent report excerpts below:  9/25/2008 MRI Orbit Face with contrast      1. The asymmetric soft-tissue attenuation and prominence   involving the right lateral laryngeal tissues described on the CT   scan of 09/01/2008 is not clearly identified on this MRI study.  A   mucosal-based lesion is not excluded on the basis of this study,  but   there is no evidence of submucosal enhancing lesion.  The finding   described on the previous CT scan may have represented inflammatory   tissue or focal secretions.        2. There is minimal asymmetry of the laryngeal tissues and vocal   cords with probable flaccid right vocal cord which is all in keeping   with history of vocal cord paralysis.        3. No evidence of cervical lymphadenopathy.        4. Opacification of the visualized inferior ethmoid air cells   bilaterally consistent with inflammation.        5. Small probable mucous retention cyst in the left maxillary   sinus with mild mucosal thickening involving maxillary sinuses   bilaterally.        6. Opacification of multiple mastoid air cells on the right.       9/1/2008 CT Neck with contrast      1. Soft-tissue laryngeal mass along the inferior and medial   aspect of the right pyriform sinus extending inferiorly to the level   of the right vocal cord.  The findings are suspicious for malignancy.   There is no lymphadenopathy in the neck.        2. Bilateral mucous retention cysts in the maxillary sinuses.     9/3/08 Video Swallow Study  CLINICAL DATA:  Dysphagia.   FINDINGS:  Oral phase is functional.  Small amounts of penetration   and possibly aspiration occurred with thin liquids.  There were trace   amounts of penetration with nectar-thick liquid.  With the head   turned to the right, penetration seems reduced.  With honey-thick   liquid there was only a trace of penetration observed one time.   Following paste and thicker texture food, there is moderate stasis   within the vallecula.  This is partially reduced with a swallow with   the head turned to the right.         IMPRESSION AND PLAN: ***  Kasi Dalton presents with right vocal fold paralysis of unknown etiology, and does achieve good glottic closure. He also has an incidentally noted laryngeal tremor which he states is not bothering him.      He was wondering whether an injection  could be helpful for him, because his friend had an injection with our clinic that was helpful. We discussed given his presentation, that an injection augmentation may not be the best first step because he already achieves reasonable glottic closure and did demonstrate better voice quality with therapy probes.  We also discussed that Botox injection for the laryngeal tremor could be considered if it began to bother him, but would be approached with caution given that side effects may include weakening of the voice as well as dysphagia.      Recommendations:  1) I recommended speech therapy as initial primary management, with goals including improving laryngeal efficiency, improving respiratory/phonatory coordination and improving phonatory mechanics.    2) Given his history of laryngeal penetration and possible aspiration as well as his advancing age, and report of intermittent difficulty with things getting stuck, we also recommended a updated video fluoroscopy swallow study with esophagram.  3) The timing of his return will depend on his response and findings based on the steps above. He could consider a minor trial injection augmentation if it seems like he has any substantial glottic insufficiency after completing speech therapy, but in current state the benefit may be limited    I appreciate the opportunity to participate in the care of this patient.     ***Today's visit required additional screening time, PPE, and cleaning measures to allow for a safe in-person visit, due to the public health emergency.    I spent a total of *** minutes on ***9/12/2022 in chart review, review of tests, patient visit, documentation, care coordination, and/or discussion with other providers about the issues documented above, separate from any documented procedure(s).      Dear Colleague:    I had the pleasure of meeting Kasi Dalton in consultation at the [facility] of the [facility] Otolaryngology [facility]  on a self-referred basis, for evaluation of throat concerns. The note from our visit follows. Speech recognition software may have been used in the documentation below; input is reviewed before signature to the best of my ability. I appreciate the opportunity to participate in the care of this pleasant patient.    Please feel free to contact me with any questions.    Sincerely yours,      Leonor Zeng M.D., M.P.H.  , Laryngology  Director, [facility]  Otolaryngology- Head & Neck Surgery  [phone]          =====  HISTORY OF PRESENT ILLNESS:   Kasi Dalton is a pleasant 87-year-old male with a past medical history including pacemaker, bronchiectasis (doing better recently), obstructive sleep apnea, hypogammaglobulinemia, mitral regurgitation, paroxysmal atrial fibrillation on anticoagulation, GERD, COVID-19,  who presents with a long history of throat concerns.       Voice  He notes a 10-15 year history of voice problems.   Record review shows that he worked with Bhavana Alvarado, SLP in 2011, with a diagnosis of unilateral vocal fold paralysis. He does not recall exactly. He vaguely recalls hearing about a possible vocal fold implant at [facility], but was told that he might still not be able to sing with it in, so he chose not to pursue it. Prior to that, he underwent a Cymetra injection with Dr. Randolph in 2008 with possible improvement per the records.  The patient does not specifically recall finding it helpful but it was a long time ago per his comments.    His symptoms are of unknown etiology, and imaging was negative. He has a friend who had a voice issue and had improvement with being seen in our clinic, so he decided to be seen again.    He notes his voice is worse with use; no odynophonia.      Swallowing  Food sometimes gets stuck or goes down the wrong tube. Solids are worse than liquids. This occurs 1-2x/ week, rarely with liquids.    Chart review  identifies a swallow study from 2008 with small amounts of penetration and possible aspiration.  He does not recall pursuing further management of this as he felt he was doing well. No recent pneumonias or lung infections.      Cough/Throat-clearing  No concerns, other than above.      Breathing  No concerns.       Throat discomfort  No concerns.       Reflux-type symptoms  He experiences heartburn/indigestion variably, sometimes multiple times a week, sometimes not at all. He finds it seems to help to take supplements earlier in the evening. He is not taking reflux medications.      Prior outside records were reviewed for this visit, including:  10/24/11 Bhavana Alvarado, SLP  10/30/08 Dr. Sravani Randolph  8/10/22 Dr. Alessia Marie    MEDICATIONS:     Current Outpatient Medications   Medication Sig Dispense Refill     Ascorbic Acid 100 MG CHEW        ELEMENTAL MAGNESIUM OR        ELIQUIS ANTICOAGULANT 5 MG tablet        fish oil-omega-3 fatty acids 1000 MG capsule        lisinopril (ZESTRIL) 10 MG tablet Take 1 tablet by mouth daily       omeprazole (PRILOSEC) 20 MG DR capsule Take by mouth daily       rosuvastatin (CRESTOR) 10 MG tablet Take 10 mg by mouth daily       tamsulosin (FLOMAX) 0.4 MG capsule Take 1 capsule by mouth daily       acetaminophen (TYLENOL) 500 MG tablet Take by mouth every 6 hours as needed for mild pain (Patient not taking: Reported on 9/12/2022)         ALLERGIES:    Allergies   Allergen Reactions     Ibuprofen Difficulty breathing       PAST MEDICAL HISTORY: No past medical history on file.    Problem Noted Date   Pacemaker 06/22/2022   History of atrial fibrillation 05/16/2022   COVID-19 08/09/2021   Overview:     Formatting of this note might be different from the original.  Hospitalized for 1 week 4/2021 in [place].   COVID-19 vaccination declined 08/09/2021   Paroxysmal atrial fibrillation 05/15/2020   Mixed hyperlipidemia 05/15/2020   GERD (gastroesophageal reflux disease) 05/15/2020   Long term  current use of anticoagulant therapy 10/02/2017   Overview:     Formatting of this note might be different from the original.  Update from Spring 2018 IMO load.   Non-rheumatic mitral regurgitation 09/28/2017   Essential hypertension 09/27/2017   Osteoarthritis 09/27/2017   S/P right inguinal hernia repair 06/08/2015   Hypogammaglobulinemia 01/18/2011   Sleep apnea 08/19/2010   Overview:     Formatting of this note might be different from the original.  LW Modifier: AHI 18/hour,Intolerant of PAP  ; Obstructive Sleep Apnea Hypopnea     Medical History Date Comments   Hypogammaglobulinemia (HRC) 01/18/2011     Essential hypertension (HRC) 09/27/2017     Long term current use of anticoagulant therapy 10/02/2017 Update from Spring 2018 IMO load.   Mixed hyperlipidemia (HRC) 05/15/2020     Non-rheumatic mitral regurgitation (HRC) 09/28/2017     Osteoarthritis 09/27/2017     Paroxysmal atrial fibrillation (HRC) 05/15/2020 Ablation 2/2022 in [place]   Sleep apnea 08/19/2010 LW Modifier: AHI 18/hour,Intolerant of PAP ; Obstructive Sleep Apnea Hypopnea   Covid-19 08/09/2021 Hospitalized for 1 week 4/2021 in [place].   COVID-19 vaccination declined 08/09/2021     Gastroesophageal reflux disease       Pacemaker               PAST SURGICAL HISTORY: No past surgical history on file.   Surgery Date Site/Laterality Comments   TONSILLECTOMY          HERNIA REPAIR          TOTAL HIP ARTHROPLASTY          BLEPHAROPLASTY          INGUINAL HERNIA REPAIR BILATERAL 05/26/2015 Right repair with mesh     CARDIAC PACEMAKER PLACEMENT     1/5/2022 fro [facility]             HABITS/SOCIAL HISTORY:    Social History     Tobacco Use     Smoking status: Not on file     Smokeless tobacco: Not on file   Substance Use Topics     Alcohol use: Not on file         FAMILY HISTORY:  No family history on file. Noncontributory.    REVIEW OF SYSTEMS:  Comprehensive 11 point review of systems was reviewed. Positives are as noted below; pertinent findings are as noted in the  HPI.     Patient Supplied Answers to Review of Systems            PHYSICAL EXAMINATION:  General: The patient was alert and conversant, and in no acute distress.    Eyes: PERRL, conjunctiva and lids normal, sclera nonicteric.  Nose: Anterior rhinoscopy: no gross abnormalities. no  bleeding; no  mucopurulence; septum grossly normal, mild mucoid drainage and/or crusting.  Oral cavity/oropharynx: No masses or lesions. Dentition in good condition. Floor of mouth and oral tongue soft to palpation. Tongue mobility and palate elevation intact and symmetric.  Ears: Normal auricles, external auditory canals bilaterally. Visualized portions of tympanic membranes normal bilaterally. Mod cerumen right EAC.  Neck: No palpable cervical lymphadenopathy. There was no  tenderness to palpation of the thyrohyoid space, which was not narrow. No obvious thyroid abnormality. Landmarks palpable.  Resp: Breathing comfortably, no stridor or stertor.  Neuro: Symmetric facial function. Other cranial nerves as documented above.  Psych: Normal affect, pleasant and cooperative.  Voice/speech: Moderate dysphonia characterized by breathiness, roughness, strain and tremor.  Extremities: No cyanosis, clubbing, or edema of the upper extremities.      Intake scores  Total Score for Last Patient-Answered VHI Questionnaire  No flowsheet data found.  Total Score for Last Patient-Answered EAT Questionnaire  No flowsheet data found.    Total Score for Last Patient-Answered CSI Questionnaire  No flowsheet data found.          PROCEDURE:   Flexible fiberoptic laryngoscopy and laryngovideostroboscopy  Indications: This procedure was warranted to evaluate the patient's laryngeal anatomy and function. Risks, benefits, and alternatives were discussed.  Description: After written informed consent was obtained, a time-out was performed to confirm patient identity, procedure, and procedure site. Topical 3% lidocaine with 0.25% phenylephrine was applied to the nasal  cavities. I performed the endoscopy and no complications were apparent. Continuous and stroboscopic light were utilized to assess routine phonation and variable frequency phonation.  Performed by: Leonor Zeng MD MPH  SLP: Cheryl Taylor MS CCC-SLP   Findings: Normal nasopharynx. Normal base of tongue, valleculae, and epiglottis. Vocal fold mobility: right: absent in median position; left: normal. Medial edges of the vocal folds: smooth and straight. No focal mucosal lesions were observed on the true vocal folds. Glissade produced appropriate elongation. There was moderate supraglottic recruitment with connected speech. Mucosa of false vocal folds, aryepiglottic folds, piriform sinuses, and posterior glottis unremarkable. Airway was patent. Response to the therapy probes was good.  Obvious vocal tremor, mixed horizontal and A-P.    The addition of stroboscopy allowed evaluation of the mucosal wave.   Amplitude: right: normal; left: normal. Symmetry: intermittent symmetry. Closure pattern: complete. Closure plane: at glottic level. Phase distribution: normal.                            IMAGING/RESULTS reviewed, with pertinent report excerpts below:  9/25/2008 MRI Orbit Face with contrast      1. The asymmetric soft-tissue attenuation and prominence   involving the right lateral laryngeal tissues described on the CT   scan of 09/01/2008 is not clearly identified on this MRI study.  A   mucosal-based lesion is not excluded on the basis of this study, but   there is no evidence of submucosal enhancing lesion.  The finding   described on the previous CT scan may have represented inflammatory   tissue or focal secretions.        2. There is minimal asymmetry of the laryngeal tissues and vocal   cords with probable flaccid right vocal cord which is all in keeping   with history of vocal cord paralysis.        3. No evidence of cervical lymphadenopathy.        4. Opacification of the visualized inferior ethmoid air  cells   bilaterally consistent with inflammation.        5. Small probable mucous retention cyst in the left maxillary   sinus with mild mucosal thickening involving maxillary sinuses   bilaterally.        6. Opacification of multiple mastoid air cells on the right.       9/1/2008 CT Neck with contrast      1. Soft-tissue laryngeal mass along the inferior and medial   aspect of the right pyriform sinus extending inferiorly to the level   of the right vocal cord.  The findings are suspicious for malignancy.   There is no lymphadenopathy in the neck.        2. Bilateral mucous retention cysts in the maxillary sinuses.     9/3/08 Video Swallow Study  CLINICAL DATA:  Dysphagia.   FINDINGS:  Oral phase is functional.  Small amounts of penetration   and possibly aspiration occurred with thin liquids.  There were trace   amounts of penetration with nectar-thick liquid.  With the head   turned to the right, penetration seems reduced.  With honey-thick   liquid there was only a trace of penetration observed one time.   Following paste and thicker texture food, there is moderate stasis   within the vallecula.  This is partially reduced with a swallow with   the head turned to the right.         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 He also has an incidentally noted laryngeal tremor which he states is not bothering him.      He was wondering whether an injection could be helpful for him, because his friend had an injection with our clinic that was helpful. We discussed given his presentation, that an injection augmentation may not be the best first step because he already achieves reasonable glottic closure and did demonstrate better voice quality with therapy probes. We did discuss that if his improvement in response to speech therapy is inadequate, we could revisit consideration of an augmentation, but the improvement might  be subtle. He does also potentially have some mild posterior insufficiency which may be more complex to address. We also discussed that Botox injection for the laryngeal tremor could be considered if it began to bother him, but would need to be approached with caution given that side effects may include weakening of the voice as well as dysphagia.      Recommendations:  1) I recommended speech therapy as initial primary management, with goals including improving laryngeal efficiency, improving respiratory/phonatory coordination and improving phonatory mechanics.    2) Given his history of laryngeal penetration and possible aspiration as well as his advancing age, and report of intermittent difficulty with things getting stuck when swallowing, we also recommended a updated video fluoroscopy swallow study with esophagram.  3) The timing of his return will depend on his response and findings based on the steps above. He could consider a minor trial injection augmentation if it seems like he has any substantial glottic insufficiency after completing speech therapy.    I appreciate the opportunity to participate in the care of this patient.     Today's visit required additional screening time, PPE, and cleaning measures to allow for a safe in-person visit, due to the public health emergency.    I spent a total of 50 minutes on 9/12/2022 in chart review, review of tests, patient visit, documentation, care coordination, and/or discussion with other providers about the issues documented above, separate from any documented procedure(s).        Again, thank you for allowing me to participate in the care of your patient.      Sincerely,    Leonor Zeng MD

## 2022-09-12 NOTE — PROGRESS NOTES
Dear Colleague:    I had the pleasure of meeting Kasi Dalton in consultation at the [facility] of the [facility] on a self-referred basis, for evaluation of throat concerns. The note from our visit follows. Speech recognition software may have been used in the documentation below; input is reviewed before signature to the best of my ability. I appreciate the opportunity to participate in the care of this pleasant patient.    Please feel free to contact me with any questions.    Sincerely yours,      Leonor Zeng M.D., M.P.H.  , Laryngology  Director, [facility] Voice [facility]  Otolaryngology- Head & Neck Surgery  [phone]          =====  HISTORY OF PRESENT ILLNESS:   Kasi Dalton is a pleasant 87-year-old male with a past medical history including pacemaker, bronchiectasis (doing better recently), obstructive sleep apnea, hypogammaglobulinemia, mitral regurgitation, paroxysmal atrial fibrillation on anticoagulation, GERD, COVID-19,  who presents with a long history of throat concerns.       Voice  He notes a 10-15 year history of voice problems.   Record review shows that he worked with Bhavana Alvarado, [facility] in 2011, with a diagnosis of unilateral vocal fold paralysis. He does not recall exactly. He vaguely recalls hearing about a possible vocal fold implant at [facility], but was told that he might still not be able to sing with it in, so he chose not to pursue it. Prior to that, he underwent a Cymetra injection with Dr. Randolph in 2008 with possible improvement per the records.  The patient does not specifically recall finding it helpful but it was a long time ago per his comments.    His symptoms are of unknown etiology, and imaging was negative. He has a friend who had a voice issue and had improvement with being seen in our clinic, so he decided to be seen again.    He notes his voice is worse with use; no  odynophonia.      Swallowing  Food sometimes gets stuck or goes down the wrong tube. Solids are worse than liquids. This occurs 1-2x/ week, rarely with liquids.    Chart review identifies a swallow study from 2008 with small amounts of penetration and possible aspiration.  He does not recall pursuing further management of this as he felt he was doing well. No recent pneumonias or lung infections.      Cough/Throat-clearing  No concerns, other than above.      Breathing  No concerns.       Throat discomfort  No concerns.       Reflux-type symptoms  He experiences heartburn/indigestion variably, sometimes multiple times a week, sometimes not at all. He finds it seems to help to take supplements earlier in the evening. He is not taking reflux medications.      Prior outside records were reviewed for this visit, including:  10/24/11 [facility] Shepherd  10/30/08 Dr. Sravani Randolph  8/10/22 Dr. Alessia Marie    MEDICATIONS:     Current Outpatient Medications   Medication Sig Dispense Refill     Ascorbic Acid 100 MG CHEW        ELEMENTAL MAGNESIUM OR        ELIQUIS ANTICOAGULANT 5 MG tablet        fish oil-omega-3 fatty acids 1000 MG capsule        lisinopril (ZESTRIL) 10 MG tablet Take 1 tablet by mouth daily       omeprazole (PRILOSEC) 20 MG DR capsule Take by mouth daily       rosuvastatin (CRESTOR) 10 MG tablet Take 10 mg by mouth daily       tamsulosin (FLOMAX) 0.4 MG capsule Take 1 capsule by mouth daily       acetaminophen (TYLENOL) 500 MG tablet Take by mouth every 6 hours as needed for mild pain (Patient not taking: Reported on 9/12/2022)         ALLERGIES:    Allergies   Allergen Reactions     Ibuprofen Difficulty breathing       PAST MEDICAL HISTORY: No past medical history on file.    Problem Noted Date   Pacemaker 06/22/2022   History of atrial fibrillation 05/16/2022   COVID-19 08/09/2021   Overview:     Formatting of this note might be different from the original.  Hospitalized for 1 week 4/2021 in [place].    COVID-19 vaccination declined 08/09/2021   Paroxysmal atrial fibrillation 05/15/2020   Mixed hyperlipidemia 05/15/2020   GERD (gastroesophageal reflux disease) 05/15/2020   Long term current use of anticoagulant therapy 10/02/2017   Overview:     Formatting of this note might be different from the original.  Update from Spring 2018 IMO load.   Non-rheumatic mitral regurgitation 09/28/2017   Essential hypertension 09/27/2017   Osteoarthritis 09/27/2017   S/P right inguinal hernia repair 06/08/2015   Hypogammaglobulinemia 01/18/2011   Sleep apnea 08/19/2010   Overview:     Formatting of this note might be different from the original.  LW Modifier: AHI 18/hour,Intolerant of PAP  ; Obstructive Sleep Apnea Hypopnea     Medical History Date Comments   Hypogammaglobulinemia (HRC) 01/18/2011     Essential hypertension (HRC) 09/27/2017     Long term current use of anticoagulant therapy 10/02/2017 Update from Spring 2018 IMO load.   Mixed hyperlipidemia (HRC) 05/15/2020     Non-rheumatic mitral regurgitation (HRC) 09/28/2017     Osteoarthritis 09/27/2017     Paroxysmal atrial fibrillation (HRC) 05/15/2020 Ablation 2/2022 in [place]   Sleep apnea 08/19/2010 LW Modifier: AHI 18/hour,Intolerant of PAP ; Obstructive Sleep Apnea Hypopnea   Covid-19 08/09/2021 Hospitalized for 1 week 4/2021 in [place].   COVID-19 vaccination declined 08/09/2021     Gastroesophageal reflux disease       Pacemaker               PAST SURGICAL HISTORY: No past surgical history on file.   Surgery Date Site/Laterality Comments   TONSILLECTOMY          HERNIA REPAIR          TOTAL HIP ARTHROPLASTY          BLEPHAROPLASTY          INGUINAL HERNIA REPAIR BILATERAL 05/26/2015 Right repair with mesh     CARDIAC PACEMAKER PLACEMENT     1/5/2022 fro [facility]             HABITS/SOCIAL HISTORY:    Social History     Tobacco Use     Smoking status: Not on file     Smokeless tobacco: Not on file   Substance Use Topics     Alcohol use: Not on file         FAMILY HISTORY:  No  family history on file. Noncontributory.    REVIEW OF SYSTEMS:  Comprehensive 11 point review of systems was reviewed. Positives are as noted below; pertinent findings are as noted in the HPI.     Patient Supplied Answers to Review of Systems            PHYSICAL EXAMINATION:  General: The patient was alert and conversant, and in no acute distress.    Eyes: PERRL, conjunctiva and lids normal, sclera nonicteric.  Nose: Anterior rhinoscopy: no gross abnormalities. no  bleeding; no  mucopurulence; septum grossly normal, mild mucoid drainage and/or crusting.  Oral cavity/oropharynx: No masses or lesions. Dentition in good condition. Floor of mouth and oral tongue soft to palpation. Tongue mobility and palate elevation intact and symmetric.  Ears: Normal auricles, external auditory canals bilaterally. Visualized portions of tympanic membranes normal bilaterally. Mod cerumen right EAC.  Neck: No palpable cervical lymphadenopathy. There was no  tenderness to palpation of the thyrohyoid space, which was not narrow. No obvious thyroid abnormality. Landmarks palpable.  Resp: Breathing comfortably, no stridor or stertor.  Neuro: Symmetric facial function. Other cranial nerves as documented above.  Psych: Normal affect, pleasant and cooperative.  Voice/speech: Moderate dysphonia characterized by breathiness, roughness, strain and tremor.  Extremities: No cyanosis, clubbing, or edema of the upper extremities.      Intake scores  Total Score for Last Patient-Answered VHI Questionnaire  No flowsheet data found.  Total Score for Last Patient-Answered EAT Questionnaire  No flowsheet data found.    Total Score for Last Patient-Answered CSI Questionnaire  No flowsheet data found.          PROCEDURE:   Flexible fiberoptic laryngoscopy and laryngovideostroboscopy  Indications: This procedure was warranted to evaluate the patient's laryngeal anatomy and function. Risks, benefits, and alternatives were discussed.  Description: After  written informed consent was obtained, a time-out was performed to confirm patient identity, procedure, and procedure site. Topical 3% lidocaine with 0.25% phenylephrine was applied to the nasal cavities. I performed the endoscopy and no complications were apparent. Continuous and stroboscopic light were utilized to assess routine phonation and variable frequency phonation.  Performed by: Leonor Zeng MD MPH  SLP: Cheryl Taylor MS CCC-SLP   Findings: Normal nasopharynx. Normal base of tongue, valleculae, and epiglottis. Vocal fold mobility: right: absent in median position; left: normal. Medial edges of the vocal folds: smooth and straight. No focal mucosal lesions were observed on the true vocal folds. Glissade produced appropriate elongation. There was moderate supraglottic recruitment with connected speech. Mucosa of false vocal folds, aryepiglottic folds, piriform sinuses, and posterior glottis unremarkable. Airway was patent. Response to the therapy probes was good.  Obvious vocal tremor, mixed horizontal and A-P.    The addition of stroboscopy allowed evaluation of the mucosal wave.   Amplitude: right: normal; left: normal. Symmetry: intermittent symmetry. Closure pattern: complete. Closure plane: at glottic level. Phase distribution: normal.                            IMAGING/RESULTS reviewed, with pertinent report excerpts below:  9/25/2008 MRI Orbit Face with contrast      1. The asymmetric soft-tissue attenuation and prominence   involving the right lateral laryngeal tissues described on the CT   scan of 09/01/2008 is not clearly identified on this MRI study.  A   mucosal-based lesion is not excluded on the basis of this study, but   there is no evidence of submucosal enhancing lesion.  The finding   described on the previous CT scan may have represented inflammatory   tissue or focal secretions.        2. There is minimal asymmetry of the laryngeal tissues and vocal   cords with probable flaccid  right vocal cord which is all in keeping   with history of vocal cord paralysis.        3. No evidence of cervical lymphadenopathy.        4. Opacification of the visualized inferior ethmoid air cells   bilaterally consistent with inflammation.        5. Small probable mucous retention cyst in the left maxillary   sinus with mild mucosal thickening involving maxillary sinuses   bilaterally.        6. Opacification of multiple mastoid air cells on the right.       9/1/2008 CT Neck with contrast      1. Soft-tissue laryngeal mass along the inferior and medial   aspect of the right pyriform sinus extending inferiorly to the level   of the right vocal cord.  The findings are suspicious for malignancy.   There is no lymphadenopathy in the neck.        2. Bilateral mucous retention cysts in the maxillary sinuses.     9/3/08 Video Swallow Study  CLINICAL DATA:  Dysphagia.   FINDINGS:  Oral phase is functional.  Small amounts of penetration   and possibly aspiration occurred with thin liquids.  There were trace   amounts of penetration with nectar-thick liquid.  With the head   turned to the right, penetration seems reduced.  With honey-thick   liquid there was only a trace of penetration observed one time.   Following paste and thicker texture food, there is moderate stasis   within the vallecula.  This is partially reduced with a swallow with   the head turned to the right.         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 He also has an incidentally noted laryngeal tremor which he states is not bothering him.      He was wondering whether an injection could be helpful for him, because his friend had an injection with our clinic that was helpful. We discussed given his presentation, that an injection augmentation may not be the best first step because he already achieves reasonable glottic closure and did demonstrate better  voice quality with therapy probes. We did discuss that if his improvement in response to speech therapy is inadequate, we could revisit consideration of an augmentation, but the improvement might be subtle. He does also potentially have some mild posterior insufficiency which may be more complex to address. We also discussed that Botox injection for the laryngeal tremor could be considered if it began to bother him, but would need to be approached with caution given that side effects may include weakening of the voice as well as dysphagia.      Recommendations:  1) I recommended speech therapy as initial primary management, with goals including improving laryngeal efficiency, improving respiratory/phonatory coordination and improving phonatory mechanics.    2) Given his history of laryngeal penetration and possible aspiration as well as his advancing age, and report of intermittent difficulty with things getting stuck when swallowing, we also recommended a updated video fluoroscopy swallow study with esophagram.  3) The timing of his return will depend on his response and findings based on the steps above. He could consider a minor trial injection augmentation if it seems like he has any substantial glottic insufficiency after completing speech therapy.    I appreciate the opportunity to participate in the care of this patient.     Today's visit required additional screening time, PPE, and cleaning measures to allow for a safe in-person visit, due to the public health emergency.    I spent a total of 50 minutes on 9/12/2022 in chart review, review of tests, patient visit, documentation, care coordination, and/or discussion with other providers about the issues documented above, separate from any documented procedure(s).

## 2022-09-12 NOTE — LETTER
Date:September 15, 2022      Patient was self referred, no letter generated. Do not send.        [facility] Health Information

## 2022-09-12 NOTE — PATIENT INSTRUCTIONS
1.  You were seen in the ENT Clinic today by . If you have any questions or concerns after your appointment, please call [phone]. Press option #1 for scheduling related needs. Press option #3 for Nurse advice.    2.   has recommended the following:   - xray video swallow and esophagram   - speech therapy    3.  Plan is to return to clinic as needed after testing      Soo Cheng LPN  [phone]  [facility] Otolaryngology

## 2022-09-16 ENCOUNTER — DOCUMENTATION ONLY (OUTPATIENT)
Dept: OTOLARYNGOLOGY | Facility: CLINIC | Age: 87
End: 2022-09-16

## 2022-09-16 NOTE — PROGRESS NOTES
Visit notes from 9/12/22 were faxed to [facility] at [facility] in [place]. Fax number [phone]. 16 pgs faxed.

## 2022-09-22 ENCOUNTER — ANCILLARY PROCEDURE (OUTPATIENT)
Dept: GENERAL RADIOLOGY | Facility: CLINIC | Age: 87
End: 2022-09-22
Attending: OTOLARYNGOLOGY
Payer: COMMERCIAL

## 2022-09-22 ENCOUNTER — THERAPY VISIT (OUTPATIENT)
Dept: SPEECH THERAPY | Facility: CLINIC | Age: 87
End: 2022-09-22
Attending: OTOLARYNGOLOGY
Payer: COMMERCIAL

## 2022-09-22 DIAGNOSIS — R13.12 OROPHARYNGEAL DYSPHAGIA: Primary | ICD-10-CM

## 2022-09-22 DIAGNOSIS — J38.01 UNILATERAL COMPLETE PARALYSIS OF VOCAL CORD: ICD-10-CM

## 2022-09-22 PROCEDURE — 99207 XR ESOPHAGRAM: CPT | Mod: GC | Performed by: RADIOLOGY

## 2022-09-22 PROCEDURE — 92611 MOTION FLUOROSCOPY/SWALLOW: CPT | Mod: GN | Performed by: SPEECH-LANGUAGE PATHOLOGIST

## 2022-09-22 PROCEDURE — 92610 EVALUATE SWALLOWING FUNCTION: CPT | Mod: GN | Performed by: SPEECH-LANGUAGE PATHOLOGIST

## 2022-09-22 NOTE — Clinical Note
Oropharyngeal function looks great-no need for swallow therapy. Definitely has esophageal dysmotility-maybe GI would be helpful? 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 I'm hoping the radiologist will comment on it more. It's very small. Best seen during the esophagram. Please let me know if you have questions!

## 2022-09-22 NOTE — PROGRESS NOTES
"     Speech-Language Pathology Department   EVALUATION  [facility]  Video Swallow Study Evaluation    09/22/22 1300   General Information   Type Of Visit Initial   Start Of Care Date 09/22/22   Referring Physician Dr. Leonor Zeng   Orders Evaluate And Treat   Orders Comment Video Swallow Study   Medical Diagnosis Unilateral vocal fold paralysis   Precautions/limitations No Known Precautions/limitations   Hearing Wearing hearing aids bilaterally; functional in 1:1 setting   Pertinent History of Current Problem/OT: Additional Occupational Profile Info Kais \"Ray\" Krystle is an 87 year old male with a PMH significant for pacemaker, bronchiectasis (doing better recently), obstructive sleep apnea, hypogammaglobulinemia, mitral regurgitation, paroxysmal atrial fibrillation on anticoagulation, GERD, COVID-19,  dysphonia (right vocal fold paralysis). He presents today for video swallow study/esophagram. He endorses feeling of stasis with solids occasionally up in his throat, but more often lower down in his chest.Denies coughing with liquids and difficulty with swallowing pills. He states he feels he has too much saliva. Denies unintentional weightloss and recent PNAs. States he takes Omeprazole, but not regularly.   Respiratory Status Room air   Prior Level Of Function Swallowing   Prior Level Of Function Comment Regular textures/thin liquids   General Observations Pt highly pleasant and cooperative throughout evaluation   Patient/family Goals Not stated   Clinical Swallow Evaluation   Oral Musculature generally intact   Structural Abnormalities none present   Dentition present and adequate   Secretion Management   (pt reports he feels like he has excess saliva)   Mucosal Quality adequate   Mandibular Strength and Mobility intact   Oral Labial Strength and Mobility WFL   Lingual Strength and Mobility WFL   Velar Elevation intact   Buccal Strength and Mobility intact "   Laryngeal Function Throat clear;Cough;Swallow;Voicing initiated;Dry swallow palpated   Oral Musculature Comments Dysphonia; otherwise WFL   Additional Documentation Yes   Additional evaluation(s) completed today Yes   Rationale for completing additional evaluation To further assess pharyngeal phase   Clinical Swallow Eval: Thin Liquid Texture Trial   Mode of Presentation, Thin Liquids cup;self-fed   Volume of Liquid or Food Presented 3oz   Oral Phase of Swallow WFL   Pharyngeal Phase of Swallow intact   Diagnostic Statement No overt clinical s/sx of penetration/aspiration   VFSS Evaluation   VFSS Additional Documentation Yes   VFSS Eval: Radiology   Radiologist Resident   Views Taken left lateral;A/P   Physical Location of Procedure [facility]   VFSS Eval: Thin Liquid Texture Trial   Mode of Presentation, Thin Liquid cup;straw;self-fed   Order of Presentation Series 1,2,3,9,10,12  (10-tablet, 12-AP)   Preparatory Phase WFL   Oral Phase, Thin Liquid WFL   Pharyngeal Phase, Thin Liquid Pharyngeal wall coating;Residue in valleculae;Residue in pyriform sinus   Rosenbek's Penetration Aspiration Scale: Thin Liquid Trial Results 1 - no aspiration, contrast does not enter airway   Diagnostic Statement No penetration/aspiration. Minimal/mild vallecular residual. Trace piriform sinus and posterior pharyngeal wall residual. Barium tablet passed through oropharynx without difficulty. AP unremarkable. Of note, pt appears to have a small diverticulum coming off the anterior wall of the esophagus inferior to the UES.   VFSS Eval: Mildly Thick Liquids    Mode of Presentation cup;self-fed   Order of Presentation Series 4   Preparatory Phase WFL   Oral Phase WFL   Pharyngeal Phase Pharyngeal wall coating;Residue in valleculae;Residue in pyriform sinus  (trace)   Rosenbek's Penetration Aspiration Scale 1 - no aspiration, contrast does not enter airway   Diagnostic Statement No penetration/aspiration. Trace vallecular, piriform  and posterior pharyngeal wall residual.   VFSS Eval: Puree Solid Texture Trial   Mode of Presentation, Puree spoon;self-fed   Order of Presentation Series 5,6,11  (11-AP)   Preparatory Phase WFL   Oral Phase, Puree Premature pharyngeal entry   Pharyngeal Phase, Puree WFL   Rosenbek's Penetration Aspiration Scale: Puree Food Trial Results 1 - no aspiration, contrast does not enter airway   Diagnostic Statement Premature spillage to valleculae. No penetration/aspiration. No significant residuals. AP reveals fairly symmetric bolus flow.   VFSS Eval: Regular Texture Trial (Solid)   Mode of Presentation self-fed   Order of Presentation Series 7,8   Preparatory Phase WFL   Oral Phase Premature pharyngeal entry   Pharyngeal Phase WFL   Rosenbek's Penetration Aspiration Scale 1 - no aspiration, contrast does not enter airway   Diagnostic Statement x1 premature spillage to valleculae with bigger bolus. No penetration/aspiration. No significant residuals.   Swallow Compensations   Swallow Compensations Multiple swallow   Results No difficulties noted   Educational Assessment   Barriers to Learning No barriers   Esophageal Phase of Swallow   Patient reports or presents with symptoms of esophageal dysphagia Yes   Esophageal sweep performed during today s vidofluoroscopic exam  Yes;Please refer to radiologist's report for details   Esophageal comments Formal esophagram performed as part of today's study; please see Radiologist's report for details   Swallow Eval: Clinical Impressions   Skilled Criteria for Therapy Intervention No problems identified which require skilled intervention   Dysphagia Outcome Severity Scale (CAITIE) Level 6 - CAITIE   Treatment Diagnosis Minimal oropharyngeal dysphagia   Diet texture recommendations Regular diet;Thin liquids (level 0)   Recommended Feeding/Eating Techniques alternate between small bites and sips of food/liquid;maintain upright posture during/after eating for 30 mins;small sips/bites;other  (see comments)  (double swallow)   Demonstrates Need for Referral to Another Service other (see comments)  (GI for esophageal dysmotility)   Anticipated Discharge Disposition home   Risks and Benefits of Treatment have been explained. Yes   Patient, family and/or staff in agreement with Plan of Care Yes   Clinical Impression Comments Pt presents today with minimal oropharyngeal dysphagia characterized by age related reduced strength of oropharyngeal musculature. Prompt swallow reponse with adequate airway protection. No penetration/aspiration of any PO trial consistency.  Minimal/mild vallecular residual with tarce piriform sinus and posterior pharyngeal wall residual with thin liquids. Puree/solid trials unremarkable. Barium tablet passed through oropharynx without difficulty. AP unremarkable. Of note, pt appears to have a small diverticulum coming off the anterior wall of the esophagus inferior to the UES. Formal esophagram performed as part of today's visit; please see Radiologist's report for details.       At this time, pt appropriate to continue with regular textures/thin liquids with use of general safe swallow strategies: small sips/bites, double swallow with liquids, slow pace, sit upright for all PO intake and for 30 min afterwards. Consider GI consult for esophageal dysmotility.     Trained pt re: results and recomendations. No further SLP services for swallowing are warranted at this time.   Total Session Time   SLP Eval: oral/pharyngeal swallow function, clinical minutes (59642) 15   SLP Eval: VideoFluoroscopic Swallow function Minutes (17347) 30   Total Evaluation Time 45     Thank you for the referral of Kasi Dalton. If you have any questions about this report, please contact me using the information below.     NIKKI Pollock. SUZANNE (music), CCC-SLP   Speech Language Pathologist  [place] Trained Vocologist   [facility]  Dept. of Otolaryngology  Department of  Rehabilitation Services  [address] [place] [id number]  Email: [email or web address]   Pronouns: she/her/hers

## 2022-09-27 ENCOUNTER — TELEPHONE (OUTPATIENT)
Dept: OTOLARYNGOLOGY | Facility: CLINIC | Age: 87
End: 2022-09-27

## 2022-09-27 DIAGNOSIS — K22.4 ESOPHAGEAL DYSMOTILITY: Primary | ICD-10-CM

## 2022-09-27 NOTE — TELEPHONE ENCOUNTER
A voicemail was left with the patient regarding the referral being sent for a GI follow up. Writer's direct number was left for patient to call back with any questions or concerns.

## 2022-09-27 NOTE — PROGRESS NOTES
Writer placed referral for patient to see GI based on provider recommendations after recent patient results. Patient has been notified of the referral. Writer's direct call back number was provided. No additional questions or concerns.

## 2023-03-16 ENCOUNTER — APPOINTMENT (RX ONLY)
Dept: URBAN - METROPOLITAN AREA CLINIC 158 | Facility: CLINIC | Age: 88
Setting detail: DERMATOLOGY
End: 2023-03-16

## 2023-03-16 DIAGNOSIS — L82.1 OTHER SEBORRHEIC KERATOSIS: ICD-10-CM

## 2023-03-16 DIAGNOSIS — L57.0 ACTINIC KERATOSIS: ICD-10-CM

## 2023-03-16 PROBLEM — D48.5 NEOPLASM OF UNCERTAIN BEHAVIOR OF SKIN: Status: ACTIVE | Noted: 2023-03-16

## 2023-03-16 PROBLEM — D04.61 CARCINOMA IN SITU OF SKIN OF RIGHT UPPER LIMB, INCLUDING SHOULDER: Status: ACTIVE | Noted: 2023-03-16

## 2023-03-16 PROCEDURE — ? BIOPSY BY SHAVE METHOD AND DESTRUCTION

## 2023-03-16 PROCEDURE — 99212 OFFICE O/P EST SF 10 MIN: CPT | Mod: 25

## 2023-03-16 PROCEDURE — 17271 DSTR MAL LES S/N/H/F/G 0.6-1: CPT

## 2023-03-16 PROCEDURE — 17003 DESTRUCT PREMALG LES 2-14: CPT

## 2023-03-16 PROCEDURE — ? LIQUID NITROGEN

## 2023-03-16 PROCEDURE — ? BIOPSY BY SHAVE METHOD

## 2023-03-16 PROCEDURE — ? COUNSELING

## 2023-03-16 PROCEDURE — 17000 DESTRUCT PREMALG LESION: CPT | Mod: 59

## 2023-03-16 PROCEDURE — 11102 TANGNTL BX SKIN SINGLE LES: CPT | Mod: 59

## 2023-03-16 ASSESSMENT — LOCATION SIMPLE DESCRIPTION DERM
LOCATION SIMPLE: RIGHT FOREHEAD
LOCATION SIMPLE: LEFT ZYGOMA
LOCATION SIMPLE: POSTERIOR SCALP
LOCATION SIMPLE: RIGHT EAR
LOCATION SIMPLE: LEFT CHEEK
LOCATION SIMPLE: SUPERIOR FOREHEAD
LOCATION SIMPLE: UPPER BACK
LOCATION SIMPLE: LEFT EAR

## 2023-03-16 ASSESSMENT — LOCATION DETAILED DESCRIPTION DERM
LOCATION DETAILED: RIGHT POSTERIOR PARIETAL SCALP
LOCATION DETAILED: RIGHT SUPERIOR POSTERIOR HELIX
LOCATION DETAILED: RIGHT SUPERIOR MEDIAL FOREHEAD
LOCATION DETAILED: LEFT SUPERIOR LATERAL BUCCAL CHEEK
LOCATION DETAILED: LEFT INFERIOR POSTERIOR HELIX
LOCATION DETAILED: SUPERIOR MID FOREHEAD
LOCATION DETAILED: RIGHT SUPERIOR OCCIPITAL SCALP
LOCATION DETAILED: LEFT CENTRAL ZYGOMA
LOCATION DETAILED: RIGHT SUPERIOR HELIX
LOCATION DETAILED: LEFT SUPERIOR HELIX
LOCATION DETAILED: SUPERIOR THORACIC SPINE

## 2023-03-16 ASSESSMENT — LOCATION ZONE DERM
LOCATION ZONE: FACE
LOCATION ZONE: SCALP
LOCATION ZONE: TRUNK
LOCATION ZONE: EAR

## 2023-03-16 NOTE — PROCEDURE: BIOPSY BY SHAVE METHOD AND DESTRUCTION
Detail Level: Detailed
Biopsy Type: H and E
Bill As?: Malignant Destruction
Size Of Lesion In Cm (Optional): 0.3
Size Of Lesion After Curettage: 0.6
Anesthesia Type: 1% lidocaine with epinephrine
Anesthesia Volume In Cc: 0.5
Hemostasis: Drysol
Destruction Type: electrodesiccation
Number Of Curettages: 3
Wound Care: Petrolatum
Lab: [id number]
Lab Facility: [id number]
Render Path Notes In Note?: No
Consent: Written consent was obtained and risks were reviewed including but not limited to scarring, infection, bleeding, scabbing, incomplete removal, nerve damage and allergy to anesthesia.
Post-Care Instructions: I reviewed with the patient in detail post-care instructions. Patient is to keep the biopsy site dry overnight, and then apply bacitracin twice daily until healed. Patient may apply hydrogen peroxide soaks to remove any crusting.
Notification Instructions: Patient will be notified of biopsy results. However, patient instructed to call the office if not contacted within 2 weeks.
Billing Type: Third-Party Bill

## 2023-03-16 NOTE — PROCEDURE: LIQUID NITROGEN
Application Tool (Optional): Liquid Nitrogen Sprayer
Render Note In Bullet Format When Appropriate: No
Detail Level: Detailed
Show Aperture Variable?: Yes
Consent: The patient's consent was obtained including but not limited to risks of crusting, scabbing, blistering, scarring, darker or lighter pigmentary change, recurrence, incomplete removal and infection.
Duration Of Freeze Thaw-Cycle (Seconds): 0
Post-Care Instructions: I reviewed with the patient in detail post-care instructions. Patient is to wear sunprotection, and avoid picking at any of the treated lesions. Pt may apply Vaseline to crusted or scabbing areas.

## 2023-03-16 NOTE — PROCEDURE: BIOPSY BY SHAVE METHOD
Detail Level: Detailed
Depth Of Biopsy: dermis
Was A Bandage Applied: Yes
Size Of Lesion In Cm: 0
Biopsy Type: H and E
Biopsy Method: curette
Anesthesia Type: 1% lidocaine with epinephrine
Anesthesia Volume In Cc (Will Not Render If 0): 0.5
Hemostasis: Drysol
Wound Care: Petrolatum
Dressing: bandage
Destruction After The Procedure: No
Type Of Destruction Used: Curettage
Curettage Text: The wound bed was treated with curettage after the biopsy was performed.
Cryotherapy Text: The wound bed was treated with cryotherapy after the biopsy was performed.
Electrodesiccation Text: The wound bed was treated with electrodesiccation after the biopsy was performed.
Electrodesiccation And Curettage Text: The wound bed was treated with electrodesiccation and curettage after the biopsy was performed.
Silver Nitrate Text: The wound bed was treated with silver nitrate after the biopsy was performed.
Lab: [id number]
Lab Facility: [id number]
Consent: Written consent was obtained and risks were reviewed including but not limited to scarring, infection, bleeding, scabbing, incomplete removal, nerve damage and allergy to anesthesia.
Post-Care Instructions: I reviewed with the patient in detail post-care instructions. Patient is to keep the biopsy site dry overnight, and then apply bacitracin twice daily until healed. Patient may apply hydrogen peroxide soaks to remove any crusting.
Notification Instructions: Patient will be notified of biopsy results. However, patient instructed to call the office if not contacted within 2 weeks.
Billing Type: Third-Party Bill
Information: Selecting Yes will display possible errors in your note based on the variables you have selected. This validation is only offered as a suggestion for you. PLEASE NOTE THAT THE VALIDATION TEXT WILL BE REMOVED WHEN YOU FINALIZE YOUR NOTE. IF YOU WANT TO FAX A PRELIMINARY NOTE YOU WILL NEED TO TOGGLE THIS TO 'NO' IF YOU DO NOT WANT IT IN YOUR FAXED NOTE.

## 2023-04-27 ENCOUNTER — APPOINTMENT (RX ONLY)
Dept: URBAN - METROPOLITAN AREA CLINIC 158 | Facility: CLINIC | Age: 88
Setting detail: DERMATOLOGY
End: 2023-04-27

## 2023-04-27 DIAGNOSIS — L82.1 OTHER SEBORRHEIC KERATOSIS: ICD-10-CM

## 2023-04-27 PROBLEM — D04.4 CARCINOMA IN SITU OF SKIN OF SCALP AND NECK: Status: ACTIVE | Noted: 2023-04-27

## 2023-04-27 PROCEDURE — ? CURETTAGE AND DESTRUCTION

## 2023-04-27 PROCEDURE — 17272 DSTR MAL LES S/N/H/F/G 1.1-2: CPT

## 2023-04-27 PROCEDURE — 99212 OFFICE O/P EST SF 10 MIN: CPT | Mod: 25

## 2023-04-27 ASSESSMENT — LOCATION DETAILED DESCRIPTION DERM
LOCATION DETAILED: LEFT LATERAL FOREHEAD
LOCATION DETAILED: RIGHT LATERAL ZYGOMA

## 2023-04-27 ASSESSMENT — LOCATION SIMPLE DESCRIPTION DERM
LOCATION SIMPLE: RIGHT ZYGOMA
LOCATION SIMPLE: LEFT FOREHEAD

## 2023-04-27 ASSESSMENT — LOCATION ZONE DERM: LOCATION ZONE: FACE

## 2024-04-22 ENCOUNTER — APPOINTMENT (RX ONLY)
Dept: URBAN - METROPOLITAN AREA CLINIC 158 | Facility: CLINIC | Age: 89
Setting detail: DERMATOLOGY
End: 2024-04-22

## 2024-04-22 DIAGNOSIS — D22 MELANOCYTIC NEVI: ICD-10-CM

## 2024-04-22 DIAGNOSIS — L57.8 OTHER SKIN CHANGES DUE TO CHRONIC EXPOSURE TO NONIONIZING RADIATION: ICD-10-CM

## 2024-04-22 DIAGNOSIS — L81.4 OTHER MELANIN HYPERPIGMENTATION: ICD-10-CM

## 2024-04-22 DIAGNOSIS — Z85.828 PERSONAL HISTORY OF OTHER MALIGNANT NEOPLASM OF SKIN: ICD-10-CM

## 2024-04-22 DIAGNOSIS — Z71.89 OTHER SPECIFIED COUNSELING: ICD-10-CM

## 2024-04-22 DIAGNOSIS — L82.1 OTHER SEBORRHEIC KERATOSIS: ICD-10-CM

## 2024-04-22 DIAGNOSIS — L85.3 XEROSIS CUTIS: ICD-10-CM

## 2024-04-22 DIAGNOSIS — D18.0 HEMANGIOMA: ICD-10-CM

## 2024-04-22 DIAGNOSIS — I78.8 OTHER DISEASES OF CAPILLARIES: ICD-10-CM

## 2024-04-22 DIAGNOSIS — L57.0 ACTINIC KERATOSIS: ICD-10-CM

## 2024-04-22 PROBLEM — D18.01 HEMANGIOMA OF SKIN AND SUBCUTANEOUS TISSUE: Status: ACTIVE | Noted: 2024-04-22

## 2024-04-22 PROBLEM — D22.5 MELANOCYTIC NEVI OF TRUNK: Status: ACTIVE | Noted: 2024-04-22

## 2024-04-22 PROCEDURE — ? LIQUID NITROGEN

## 2024-04-22 PROCEDURE — 17003 DESTRUCT PREMALG LES 2-14: CPT

## 2024-04-22 PROCEDURE — ? COUNSELING

## 2024-04-22 PROCEDURE — 17000 DESTRUCT PREMALG LESION: CPT

## 2024-04-22 PROCEDURE — 99213 OFFICE O/P EST LOW 20 MIN: CPT | Mod: 25

## 2024-04-22 ASSESSMENT — LOCATION DETAILED DESCRIPTION DERM
LOCATION DETAILED: RIGHT INFERIOR CENTRAL MALAR CHEEK
LOCATION DETAILED: LEFT CENTRAL FRONTAL SCALP
LOCATION DETAILED: RIGHT SUPERIOR LATERAL NECK
LOCATION DETAILED: POSTERIOR MID-PARIETAL SCALP
LOCATION DETAILED: RIGHT SUPERIOR MEDIAL FOREHEAD
LOCATION DETAILED: RIGHT SUPERIOR POSTERIOR HELIX
LOCATION DETAILED: RIGHT CENTRAL MALAR CHEEK
LOCATION DETAILED: LEFT DORSAL INDEX METACARPOPHALANGEAL JOINT
LOCATION DETAILED: RIGHT LATERAL ZYGOMA
LOCATION DETAILED: LEFT LATERAL FOREHEAD
LOCATION DETAILED: EPIGASTRIC SKIN
LOCATION DETAILED: LEFT POSTERIOR EAR
LOCATION DETAILED: LEFT POSTERIOR SHOULDER
LOCATION DETAILED: LEFT DISTAL PRETIBIAL REGION
LOCATION DETAILED: LEFT SUPERIOR OCCIPITAL SCALP
LOCATION DETAILED: RIGHT DISTAL PRETIBIAL REGION
LOCATION DETAILED: LEFT MEDIAL FRONTAL SCALP
LOCATION DETAILED: RIGHT INFERIOR UPPER BACK
LOCATION DETAILED: LEFT MEDIAL FOREHEAD

## 2024-04-22 ASSESSMENT — LOCATION SIMPLE DESCRIPTION DERM
LOCATION SIMPLE: LEFT FOREHEAD
LOCATION SIMPLE: NECK
LOCATION SIMPLE: ABDOMEN
LOCATION SIMPLE: RIGHT FOREHEAD
LOCATION SIMPLE: RIGHT EAR
LOCATION SIMPLE: POSTERIOR SCALP
LOCATION SIMPLE: LEFT HAND
LOCATION SIMPLE: LEFT SCALP
LOCATION SIMPLE: RIGHT CHEEK
LOCATION SIMPLE: RIGHT ZYGOMA
LOCATION SIMPLE: LEFT SHOULDER
LOCATION SIMPLE: LEFT PRETIBIAL REGION
LOCATION SIMPLE: RIGHT PRETIBIAL REGION
LOCATION SIMPLE: RIGHT UPPER BACK
LOCATION SIMPLE: LEFT EAR

## 2024-04-22 ASSESSMENT — LOCATION ZONE DERM
LOCATION ZONE: LEG
LOCATION ZONE: HAND
LOCATION ZONE: FACE
LOCATION ZONE: SCALP
LOCATION ZONE: ARM
LOCATION ZONE: NECK
LOCATION ZONE: TRUNK
LOCATION ZONE: EAR

## 2025-03-20 ENCOUNTER — APPOINTMENT (OUTPATIENT)
Dept: URBAN - METROPOLITAN AREA CLINIC 158 | Facility: CLINIC | Age: 89
Setting detail: DERMATOLOGY
End: 2025-03-20

## 2025-03-20 DIAGNOSIS — Z86.007 PERSONAL HISTORY OF IN-SITU NEOPLASM OF SKIN: ICD-10-CM

## 2025-03-20 DIAGNOSIS — Z71.89 OTHER SPECIFIED COUNSELING: ICD-10-CM

## 2025-03-20 DIAGNOSIS — L82.1 OTHER SEBORRHEIC KERATOSIS: ICD-10-CM

## 2025-03-20 DIAGNOSIS — D18.0 HEMANGIOMA: ICD-10-CM

## 2025-03-20 DIAGNOSIS — L57.0 ACTINIC KERATOSIS: ICD-10-CM

## 2025-03-20 PROBLEM — D48.5 NEOPLASM OF UNCERTAIN BEHAVIOR OF SKIN: Status: ACTIVE | Noted: 2025-03-20

## 2025-03-20 PROBLEM — D18.01 HEMANGIOMA OF SKIN AND SUBCUTANEOUS TISSUE: Status: ACTIVE | Noted: 2025-03-20

## 2025-03-20 PROCEDURE — 17000 DESTRUCT PREMALG LESION: CPT | Mod: 59

## 2025-03-20 PROCEDURE — ? LIQUID NITROGEN

## 2025-03-20 PROCEDURE — ? OBSERVATION

## 2025-03-20 PROCEDURE — 17003 DESTRUCT PREMALG LES 2-14: CPT

## 2025-03-20 PROCEDURE — ? COUNSELING

## 2025-03-20 PROCEDURE — 99213 OFFICE O/P EST LOW 20 MIN: CPT | Mod: 25

## 2025-03-20 PROCEDURE — ? BIOPSY BY SHAVE METHOD

## 2025-03-20 PROCEDURE — 11102 TANGNTL BX SKIN SINGLE LES: CPT

## 2025-03-20 ASSESSMENT — LOCATION ZONE DERM
LOCATION ZONE: HAND
LOCATION ZONE: NECK
LOCATION ZONE: TRUNK
LOCATION ZONE: FACE
LOCATION ZONE: SCALP

## 2025-03-20 ASSESSMENT — LOCATION DETAILED DESCRIPTION DERM
LOCATION DETAILED: 2ND WEB SPACE RIGHT HAND
LOCATION DETAILED: LEFT MID-UPPER BACK
LOCATION DETAILED: LEFT CENTRAL LATERAL NECK
LOCATION DETAILED: LEFT LATERAL ABDOMEN
LOCATION DETAILED: LEFT SUPERIOR LATERAL NECK
LOCATION DETAILED: MID-FRONTAL SCALP
LOCATION DETAILED: RIGHT SUPERIOR OCCIPITAL SCALP
LOCATION DETAILED: LEFT FOREHEAD
LOCATION DETAILED: RIGHT MID-UPPER BACK
LOCATION DETAILED: EPIGASTRIC SKIN
LOCATION DETAILED: RIGHT CENTRAL PARIETAL SCALP
LOCATION DETAILED: LEFT SUPERIOR OCCIPITAL SCALP
LOCATION DETAILED: RIGHT MEDIAL INFERIOR CHEST

## 2025-03-20 ASSESSMENT — LOCATION SIMPLE DESCRIPTION DERM
LOCATION SIMPLE: RIGHT OCCIPITAL SCALP
LOCATION SIMPLE: RIGHT UPPER BACK
LOCATION SIMPLE: CHEST
LOCATION SIMPLE: RIGHT HAND
LOCATION SIMPLE: POSTERIOR SCALP
LOCATION SIMPLE: ABDOMEN
LOCATION SIMPLE: ANTERIOR SCALP
LOCATION SIMPLE: NECK
LOCATION SIMPLE: SCALP
LOCATION SIMPLE: LEFT FOREHEAD
LOCATION SIMPLE: LEFT UPPER BACK

## 2025-03-20 NOTE — PROCEDURE: LIQUID NITROGEN
Render Note In Bullet Format When Appropriate: No
Detail Level: Detailed
Consent: The patient's consent was obtained including but not limited to risks of crusting, scabbing, blistering, scarring, darker or lighter pigmentary change, recurrence, incomplete removal and infection.
Show Aperture Variable?: Yes
Post-Care Instructions: I reviewed with the patient in detail post-care instructions. Patient is to wear sunprotection, and avoid picking at any of the treated lesions. Pt may apply Vaseline to crusted or scabbing areas.
Application Tool (Optional): Liquid Nitrogen Sprayer
Duration Of Freeze Thaw-Cycle (Seconds): 0